# Patient Record
Sex: MALE | Race: WHITE | NOT HISPANIC OR LATINO
[De-identification: names, ages, dates, MRNs, and addresses within clinical notes are randomized per-mention and may not be internally consistent; named-entity substitution may affect disease eponyms.]

---

## 2019-04-09 ENCOUNTER — HOSPITAL (OUTPATIENT)
Dept: OTHER | Age: 59
End: 2019-04-09

## 2019-04-19 ENCOUNTER — HOSPITAL (OUTPATIENT)
Dept: OTHER | Age: 59
End: 2019-04-19

## 2019-04-22 ENCOUNTER — HOSPITAL (OUTPATIENT)
Dept: OTHER | Age: 59
End: 2019-04-22

## 2019-04-26 ENCOUNTER — HOSPITAL (OUTPATIENT)
Dept: OTHER | Age: 59
End: 2019-04-26

## 2019-06-17 ENCOUNTER — WALK IN (OUTPATIENT)
Dept: URGENT CARE | Age: 59
End: 2019-06-17

## 2019-06-17 VITALS
BODY MASS INDEX: 36.45 KG/M2 | OXYGEN SATURATION: 96 % | TEMPERATURE: 98.2 F | DIASTOLIC BLOOD PRESSURE: 80 MMHG | WEIGHT: 315 LBS | HEART RATE: 80 BPM | HEIGHT: 78 IN | SYSTOLIC BLOOD PRESSURE: 128 MMHG | RESPIRATION RATE: 20 BRPM

## 2019-06-17 RX ORDER — ESOMEPRAZOLE MAGNESIUM 40 MG/1
40 CAPSULE, DELAYED RELEASE ORAL
COMMUNITY

## 2019-06-17 RX ORDER — DESLORATADINE 5 MG/1
5 TABLET ORAL DAILY
COMMUNITY

## 2019-06-17 RX ORDER — MELOXICAM 15 MG/1
15 TABLET ORAL DAILY
COMMUNITY

## 2019-06-17 RX ORDER — SIMVASTATIN 40 MG
40 TABLET ORAL NIGHTLY
COMMUNITY

## 2019-06-17 RX ORDER — GLIMEPIRIDE 2 MG/1
2 TABLET ORAL
COMMUNITY

## 2019-06-17 RX ORDER — AMLODIPINE BESYLATE 5 MG/1
5 TABLET ORAL DAILY
COMMUNITY

## 2019-06-17 RX ORDER — IRBESARTAN 150 MG/1
150 TABLET ORAL NIGHTLY
COMMUNITY

## 2021-06-24 ENCOUNTER — APPOINTMENT (OUTPATIENT)
Dept: GENERAL RADIOLOGY | Facility: HOSPITAL | Age: 61
End: 2021-06-24

## 2021-06-24 ENCOUNTER — ANESTHESIA (OUTPATIENT)
Dept: PERIOP | Facility: HOSPITAL | Age: 61
End: 2021-06-24

## 2021-06-24 ENCOUNTER — APPOINTMENT (OUTPATIENT)
Dept: CT IMAGING | Facility: HOSPITAL | Age: 61
End: 2021-06-24

## 2021-06-24 ENCOUNTER — HOSPITAL ENCOUNTER (OUTPATIENT)
Facility: HOSPITAL | Age: 61
Discharge: HOME | End: 2021-06-25
Attending: INTERNAL MEDICINE | Admitting: INTERNAL MEDICINE

## 2021-06-24 ENCOUNTER — ANESTHESIA EVENT (OUTPATIENT)
Dept: PERIOP | Facility: HOSPITAL | Age: 61
End: 2021-06-24

## 2021-06-24 DIAGNOSIS — N17.9 ACUTE KIDNEY INJURY: ICD-10-CM

## 2021-06-24 DIAGNOSIS — N17.9 ACUTE RENAL FAILURE, UNSPECIFIED ACUTE RENAL FAILURE TYPE: ICD-10-CM

## 2021-06-24 DIAGNOSIS — N20.1 LEFT URETERAL STONE: ICD-10-CM

## 2021-06-24 DIAGNOSIS — N20.1 URETEROLITHIASIS: Primary | ICD-10-CM

## 2021-06-24 PROBLEM — R10.9 ACUTE LEFT FLANK PAIN: Status: ACTIVE | Noted: 2021-06-24

## 2021-06-24 PROBLEM — N13.30 HYDRONEPHROSIS: Status: ACTIVE | Noted: 2021-06-24

## 2021-06-24 LAB
ALBUMIN SERPL-MCNC: 3.9 G/DL (ref 3.5–5.2)
ALBUMIN/GLOB SERPL: 1.2 G/DL
ALP SERPL-CCNC: 76 U/L (ref 39–117)
ALT SERPL W P-5'-P-CCNC: 28 U/L (ref 1–41)
ANION GAP SERPL CALCULATED.3IONS-SCNC: 12 MMOL/L (ref 5–15)
AST SERPL-CCNC: 20 U/L (ref 1–40)
BASOPHILS # BLD AUTO: 0.05 10*3/MM3 (ref 0–0.2)
BASOPHILS NFR BLD AUTO: 0.6 % (ref 0–1.5)
BILIRUB SERPL-MCNC: 0.2 MG/DL (ref 0–1.2)
BUN SERPL-MCNC: 42 MG/DL (ref 8–23)
BUN/CREAT SERPL: 13.8 (ref 7–25)
CALCIUM SPEC-SCNC: 8.7 MG/DL (ref 8.6–10.5)
CHLORIDE SERPL-SCNC: 105 MMOL/L (ref 98–107)
CO2 SERPL-SCNC: 22 MMOL/L (ref 22–29)
CREAT SERPL-MCNC: 3.04 MG/DL (ref 0.76–1.27)
DEPRECATED RDW RBC AUTO: 45.2 FL (ref 37–54)
EOSINOPHIL # BLD AUTO: 0.32 10*3/MM3 (ref 0–0.4)
EOSINOPHIL NFR BLD AUTO: 3.8 % (ref 0.3–6.2)
ERYTHROCYTE [DISTWIDTH] IN BLOOD BY AUTOMATED COUNT: 12.9 % (ref 12.3–15.4)
GFR SERPL CREATININE-BSD FRML MDRD: 21 ML/MIN/1.73
GLOBULIN UR ELPH-MCNC: 3.2 GM/DL
GLUCOSE BLDC GLUCOMTR-MCNC: 140 MG/DL (ref 70–130)
GLUCOSE SERPL-MCNC: 178 MG/DL (ref 65–99)
HCT VFR BLD AUTO: 33.6 % (ref 37.5–51)
HGB BLD-MCNC: 11.2 G/DL (ref 13–17.7)
IMM GRANULOCYTES # BLD AUTO: 0.03 10*3/MM3 (ref 0–0.05)
IMM GRANULOCYTES NFR BLD AUTO: 0.4 % (ref 0–0.5)
LYMPHOCYTES # BLD AUTO: 1.93 10*3/MM3 (ref 0.7–3.1)
LYMPHOCYTES NFR BLD AUTO: 23.1 % (ref 19.6–45.3)
MCH RBC QN AUTO: 31.7 PG (ref 26.6–33)
MCHC RBC AUTO-ENTMCNC: 33.3 G/DL (ref 31.5–35.7)
MCV RBC AUTO: 95.2 FL (ref 79–97)
MONOCYTES # BLD AUTO: 0.77 10*3/MM3 (ref 0.1–0.9)
MONOCYTES NFR BLD AUTO: 9.2 % (ref 5–12)
NEUTROPHILS NFR BLD AUTO: 5.24 10*3/MM3 (ref 1.7–7)
NEUTROPHILS NFR BLD AUTO: 62.9 % (ref 42.7–76)
NRBC BLD AUTO-RTO: 0 /100 WBC (ref 0–0.2)
PLATELET # BLD AUTO: 187 10*3/MM3 (ref 140–450)
PMV BLD AUTO: 10.3 FL (ref 6–12)
POTASSIUM SERPL-SCNC: 3.8 MMOL/L (ref 3.5–5.2)
PROCALCITONIN SERPL-MCNC: 0.16 NG/ML (ref 0–0.25)
PROT SERPL-MCNC: 7.1 G/DL (ref 6–8.5)
RBC # BLD AUTO: 3.53 10*6/MM3 (ref 4.14–5.8)
SARS-COV-2 RNA PNL SPEC NAA+PROBE: NOT DETECTED
SODIUM SERPL-SCNC: 139 MMOL/L (ref 136–145)
WBC # BLD AUTO: 8.34 10*3/MM3 (ref 3.4–10.8)

## 2021-06-24 PROCEDURE — C1769 GUIDE WIRE: HCPCS | Performed by: UROLOGY

## 2021-06-24 PROCEDURE — 96374 THER/PROPH/DIAG INJ IV PUSH: CPT

## 2021-06-24 PROCEDURE — 25010000002 FENTANYL CITRATE (PF) 100 MCG/2ML SOLUTION: Performed by: NURSE ANESTHETIST, CERTIFIED REGISTERED

## 2021-06-24 PROCEDURE — 25010000002 ONDANSETRON PER 1 MG: Performed by: NURSE PRACTITIONER

## 2021-06-24 PROCEDURE — 74420 UROGRAPHY RTRGR +-KUB: CPT

## 2021-06-24 PROCEDURE — 52332 CYSTOSCOPY AND TREATMENT: CPT | Performed by: UROLOGY

## 2021-06-24 PROCEDURE — 82962 GLUCOSE BLOOD TEST: CPT

## 2021-06-24 PROCEDURE — 96376 TX/PRO/DX INJ SAME DRUG ADON: CPT

## 2021-06-24 PROCEDURE — 84145 PROCALCITONIN (PCT): CPT | Performed by: NURSE PRACTITIONER

## 2021-06-24 PROCEDURE — 25010000003 CEFAZOLIN PER 500 MG: Performed by: NURSE ANESTHETIST, CERTIFIED REGISTERED

## 2021-06-24 PROCEDURE — 74420 UROGRAPHY RTRGR +-KUB: CPT | Performed by: UROLOGY

## 2021-06-24 PROCEDURE — 99284 EMERGENCY DEPT VISIT MOD MDM: CPT

## 2021-06-24 PROCEDURE — 85025 COMPLETE CBC W/AUTO DIFF WBC: CPT | Performed by: NURSE PRACTITIONER

## 2021-06-24 PROCEDURE — C1758 CATHETER, URETERAL: HCPCS | Performed by: UROLOGY

## 2021-06-24 PROCEDURE — 80053 COMPREHEN METABOLIC PANEL: CPT | Performed by: NURSE PRACTITIONER

## 2021-06-24 PROCEDURE — 25010000002 SUCCINYLCHOLINE PER 20 MG: Performed by: NURSE ANESTHETIST, CERTIFIED REGISTERED

## 2021-06-24 PROCEDURE — BT1F1ZZ FLUOROSCOPY OF LEFT KIDNEY, URETER AND BLADDER USING LOW OSMOLAR CONTRAST: ICD-10-PCS | Performed by: UROLOGY

## 2021-06-24 PROCEDURE — 87635 SARS-COV-2 COVID-19 AMP PRB: CPT | Performed by: NURSE PRACTITIONER

## 2021-06-24 PROCEDURE — 25010000002 ONDANSETRON PER 1 MG: Performed by: NURSE ANESTHETIST, CERTIFIED REGISTERED

## 2021-06-24 PROCEDURE — 52351 CYSTOURETERO & OR PYELOSCOPE: CPT | Performed by: UROLOGY

## 2021-06-24 PROCEDURE — C2617 STENT, NON-COR, TEM W/O DEL: HCPCS | Performed by: UROLOGY

## 2021-06-24 PROCEDURE — 25010000002 HYDROMORPHONE PER 4 MG: Performed by: NURSE ANESTHETIST, CERTIFIED REGISTERED

## 2021-06-24 PROCEDURE — 25010000002 FENTANYL CITRATE (PF) 50 MCG/ML SOLUTION: Performed by: NURSE ANESTHETIST, CERTIFIED REGISTERED

## 2021-06-24 PROCEDURE — 25010000002 IOPAMIDOL 61 % SOLUTION: Performed by: UROLOGY

## 2021-06-24 PROCEDURE — 99223 1ST HOSP IP/OBS HIGH 75: CPT | Performed by: UROLOGY

## 2021-06-24 PROCEDURE — 25010000002 CEFAZOLIN PER 500 MG: Performed by: NURSE ANESTHETIST, CERTIFIED REGISTERED

## 2021-06-24 PROCEDURE — 0 HYDROMORPHONE 1 MG/ML SOLUTION: Performed by: NURSE PRACTITIONER

## 2021-06-24 PROCEDURE — 25010000002 PROPOFOL 10 MG/ML EMULSION: Performed by: NURSE ANESTHETIST, CERTIFIED REGISTERED

## 2021-06-24 PROCEDURE — 74176 CT ABD & PELVIS W/O CONTRAST: CPT

## 2021-06-24 PROCEDURE — 0T778DZ DILATION OF LEFT URETER WITH INTRALUMINAL DEVICE, VIA NATURAL OR ARTIFICIAL OPENING ENDOSCOPIC: ICD-10-PCS | Performed by: UROLOGY

## 2021-06-24 PROCEDURE — 25010000003 HYDROMORPHONE 1 MG/ML SOLUTION: Performed by: NURSE PRACTITIONER

## 2021-06-24 DEVICE — URETERAL STENT
Type: IMPLANTABLE DEVICE | Site: URETER | Status: FUNCTIONAL
Brand: PERCUFLEX™ PLUS

## 2021-06-24 RX ORDER — HYDROCHLOROTHIAZIDE 25 MG/1
25 TABLET ORAL DAILY
COMMUNITY

## 2021-06-24 RX ORDER — GLIMEPIRIDE 2 MG/1
2 TABLET ORAL
COMMUNITY

## 2021-06-24 RX ORDER — CETIRIZINE HYDROCHLORIDE 10 MG/1
10 TABLET ORAL DAILY
Status: DISCONTINUED | OUTPATIENT
Start: 2021-06-25 | End: 2021-06-25 | Stop reason: HOSPADM

## 2021-06-24 RX ORDER — IBUPROFEN 600 MG/1
600 TABLET ORAL ONCE AS NEEDED
Status: DISCONTINUED | OUTPATIENT
Start: 2021-06-24 | End: 2021-06-24 | Stop reason: HOSPADM

## 2021-06-24 RX ORDER — ONDANSETRON 2 MG/ML
4 INJECTION INTRAMUSCULAR; INTRAVENOUS EVERY 6 HOURS PRN
Status: DISCONTINUED | OUTPATIENT
Start: 2021-06-24 | End: 2021-06-25 | Stop reason: HOSPADM

## 2021-06-24 RX ORDER — ACETAMINOPHEN 650 MG/1
650 SUPPOSITORY RECTAL EVERY 4 HOURS PRN
Status: DISCONTINUED | OUTPATIENT
Start: 2021-06-24 | End: 2021-06-25 | Stop reason: HOSPADM

## 2021-06-24 RX ORDER — SODIUM CHLORIDE 0.9 % (FLUSH) 0.9 %
10 SYRINGE (ML) INJECTION AS NEEDED
Status: DISCONTINUED | OUTPATIENT
Start: 2021-06-24 | End: 2021-06-25 | Stop reason: HOSPADM

## 2021-06-24 RX ORDER — SODIUM CHLORIDE 0.9 % (FLUSH) 0.9 %
10 SYRINGE (ML) INJECTION EVERY 12 HOURS SCHEDULED
Status: DISCONTINUED | OUTPATIENT
Start: 2021-06-24 | End: 2021-06-25 | Stop reason: HOSPADM

## 2021-06-24 RX ORDER — PROPOFOL 10 MG/ML
VIAL (ML) INTRAVENOUS AS NEEDED
Status: DISCONTINUED | OUTPATIENT
Start: 2021-06-24 | End: 2021-06-24 | Stop reason: SURG

## 2021-06-24 RX ORDER — MAGNESIUM HYDROXIDE 1200 MG/15ML
LIQUID ORAL AS NEEDED
Status: DISCONTINUED | OUTPATIENT
Start: 2021-06-24 | End: 2021-06-24 | Stop reason: HOSPADM

## 2021-06-24 RX ORDER — CEFAZOLIN SODIUM 1 G/3ML
INJECTION, POWDER, FOR SOLUTION INTRAMUSCULAR; INTRAVENOUS AS NEEDED
Status: DISCONTINUED | OUTPATIENT
Start: 2021-06-24 | End: 2021-06-24 | Stop reason: SURG

## 2021-06-24 RX ORDER — LIDOCAINE HYDROCHLORIDE 10 MG/ML
0.5 INJECTION, SOLUTION EPIDURAL; INFILTRATION; INTRACAUDAL; PERINEURAL ONCE AS NEEDED
Status: CANCELLED | OUTPATIENT
Start: 2021-06-24

## 2021-06-24 RX ORDER — DEXTROSE MONOHYDRATE 25 G/50ML
25 INJECTION, SOLUTION INTRAVENOUS
Status: DISCONTINUED | OUTPATIENT
Start: 2021-06-24 | End: 2021-06-25 | Stop reason: HOSPADM

## 2021-06-24 RX ORDER — ASPIRIN 81 MG/1
81 TABLET, CHEWABLE ORAL DAILY
COMMUNITY

## 2021-06-24 RX ORDER — ATORVASTATIN CALCIUM 10 MG/1
10 TABLET, FILM COATED ORAL NIGHTLY
Status: DISCONTINUED | OUTPATIENT
Start: 2021-06-24 | End: 2021-06-25 | Stop reason: HOSPADM

## 2021-06-24 RX ORDER — ROCURONIUM BROMIDE 10 MG/ML
INJECTION, SOLUTION INTRAVENOUS AS NEEDED
Status: DISCONTINUED | OUTPATIENT
Start: 2021-06-24 | End: 2021-06-24 | Stop reason: SURG

## 2021-06-24 RX ORDER — SODIUM CHLORIDE, SODIUM LACTATE, POTASSIUM CHLORIDE, CALCIUM CHLORIDE 600; 310; 30; 20 MG/100ML; MG/100ML; MG/100ML; MG/100ML
INJECTION, SOLUTION INTRAVENOUS CONTINUOUS PRN
Status: DISCONTINUED | OUTPATIENT
Start: 2021-06-24 | End: 2021-06-24 | Stop reason: SURG

## 2021-06-24 RX ORDER — FLUMAZENIL 0.1 MG/ML
0.2 INJECTION INTRAVENOUS AS NEEDED
Status: DISCONTINUED | OUTPATIENT
Start: 2021-06-24 | End: 2021-06-24 | Stop reason: HOSPADM

## 2021-06-24 RX ORDER — ONDANSETRON 2 MG/ML
INJECTION INTRAMUSCULAR; INTRAVENOUS AS NEEDED
Status: DISCONTINUED | OUTPATIENT
Start: 2021-06-24 | End: 2021-06-24 | Stop reason: SURG

## 2021-06-24 RX ORDER — SODIUM CHLORIDE, SODIUM LACTATE, POTASSIUM CHLORIDE, CALCIUM CHLORIDE 600; 310; 30; 20 MG/100ML; MG/100ML; MG/100ML; MG/100ML
100 INJECTION, SOLUTION INTRAVENOUS CONTINUOUS
Status: DISCONTINUED | OUTPATIENT
Start: 2021-06-24 | End: 2021-06-25 | Stop reason: HOSPADM

## 2021-06-24 RX ORDER — ONDANSETRON 2 MG/ML
4 INJECTION INTRAMUSCULAR; INTRAVENOUS ONCE
Status: COMPLETED | OUTPATIENT
Start: 2021-06-24 | End: 2021-06-24

## 2021-06-24 RX ORDER — ACETAMINOPHEN 160 MG/5ML
650 SOLUTION ORAL EVERY 4 HOURS PRN
Status: DISCONTINUED | OUTPATIENT
Start: 2021-06-24 | End: 2021-06-25 | Stop reason: HOSPADM

## 2021-06-24 RX ORDER — SUCCINYLCHOLINE CHLORIDE 20 MG/ML
INJECTION INTRAMUSCULAR; INTRAVENOUS AS NEEDED
Status: DISCONTINUED | OUTPATIENT
Start: 2021-06-24 | End: 2021-06-24 | Stop reason: SURG

## 2021-06-24 RX ORDER — SODIUM CHLORIDE 0.9 % (FLUSH) 0.9 %
3-10 SYRINGE (ML) INJECTION AS NEEDED
Status: CANCELLED | OUTPATIENT
Start: 2021-06-24

## 2021-06-24 RX ORDER — DESLORATADINE 5 MG/1
5 TABLET ORAL DAILY
COMMUNITY

## 2021-06-24 RX ORDER — OXYCODONE AND ACETAMINOPHEN 10; 325 MG/1; MG/1
1 TABLET ORAL ONCE AS NEEDED
Status: DISCONTINUED | OUTPATIENT
Start: 2021-06-24 | End: 2021-06-24 | Stop reason: HOSPADM

## 2021-06-24 RX ORDER — SITAGLIPTIN AND METFORMIN HYDROCHLORIDE 1000; 50 MG/1; MG/1
1 TABLET, FILM COATED ORAL 2 TIMES DAILY WITH MEALS
Status: ON HOLD | COMMUNITY
End: 2021-06-25 | Stop reason: SDUPTHER

## 2021-06-24 RX ORDER — SIMVASTATIN 40 MG
40 TABLET ORAL NIGHTLY
COMMUNITY

## 2021-06-24 RX ORDER — HYDROMORPHONE HYDROCHLORIDE 1 MG/ML
0.5 INJECTION, SOLUTION INTRAMUSCULAR; INTRAVENOUS; SUBCUTANEOUS
Status: DISCONTINUED | OUTPATIENT
Start: 2021-06-24 | End: 2021-06-24 | Stop reason: HOSPADM

## 2021-06-24 RX ORDER — LIDOCAINE HYDROCHLORIDE 20 MG/ML
INJECTION, SOLUTION EPIDURAL; INFILTRATION; INTRACAUDAL; PERINEURAL AS NEEDED
Status: DISCONTINUED | OUTPATIENT
Start: 2021-06-24 | End: 2021-06-24 | Stop reason: SURG

## 2021-06-24 RX ORDER — ONDANSETRON 4 MG/1
4 TABLET, FILM COATED ORAL EVERY 6 HOURS PRN
Status: DISCONTINUED | OUTPATIENT
Start: 2021-06-24 | End: 2021-06-25 | Stop reason: HOSPADM

## 2021-06-24 RX ORDER — LABETALOL HYDROCHLORIDE 5 MG/ML
5 INJECTION, SOLUTION INTRAVENOUS
Status: DISCONTINUED | OUTPATIENT
Start: 2021-06-24 | End: 2021-06-24 | Stop reason: HOSPADM

## 2021-06-24 RX ORDER — NICOTINE POLACRILEX 4 MG
15 LOZENGE BUCCAL
Status: DISCONTINUED | OUTPATIENT
Start: 2021-06-24 | End: 2021-06-25 | Stop reason: HOSPADM

## 2021-06-24 RX ORDER — SODIUM CHLORIDE, SODIUM LACTATE, POTASSIUM CHLORIDE, CALCIUM CHLORIDE 600; 310; 30; 20 MG/100ML; MG/100ML; MG/100ML; MG/100ML
100 INJECTION, SOLUTION INTRAVENOUS CONTINUOUS
Status: CANCELLED | OUTPATIENT
Start: 2021-06-24

## 2021-06-24 RX ORDER — ONDANSETRON 2 MG/ML
4 INJECTION INTRAMUSCULAR; INTRAVENOUS AS NEEDED
Status: DISCONTINUED | OUTPATIENT
Start: 2021-06-24 | End: 2021-06-24 | Stop reason: HOSPADM

## 2021-06-24 RX ORDER — FENTANYL CITRATE 50 UG/ML
INJECTION, SOLUTION INTRAMUSCULAR; INTRAVENOUS AS NEEDED
Status: DISCONTINUED | OUTPATIENT
Start: 2021-06-24 | End: 2021-06-24 | Stop reason: SURG

## 2021-06-24 RX ORDER — ACETAMINOPHEN 325 MG/1
650 TABLET ORAL EVERY 4 HOURS PRN
Status: DISCONTINUED | OUTPATIENT
Start: 2021-06-24 | End: 2021-06-25 | Stop reason: HOSPADM

## 2021-06-24 RX ORDER — NALOXONE HCL 0.4 MG/ML
0.04 VIAL (ML) INJECTION AS NEEDED
Status: DISCONTINUED | OUTPATIENT
Start: 2021-06-24 | End: 2021-06-24 | Stop reason: HOSPADM

## 2021-06-24 RX ORDER — SODIUM CHLORIDE 0.9 % (FLUSH) 0.9 %
3 SYRINGE (ML) INJECTION EVERY 12 HOURS SCHEDULED
Status: CANCELLED | OUTPATIENT
Start: 2021-06-24

## 2021-06-24 RX ORDER — MIDAZOLAM HYDROCHLORIDE 1 MG/ML
1 INJECTION INTRAMUSCULAR; INTRAVENOUS
Status: CANCELLED | OUTPATIENT
Start: 2021-06-24

## 2021-06-24 RX ORDER — ESOMEPRAZOLE MAGNESIUM 40 MG/1
40 CAPSULE, DELAYED RELEASE ORAL
COMMUNITY

## 2021-06-24 RX ORDER — ACETAMINOPHEN 500 MG
1000 TABLET ORAL ONCE
Status: CANCELLED | OUTPATIENT
Start: 2021-06-24 | End: 2021-06-24

## 2021-06-24 RX ORDER — FENTANYL CITRATE 50 UG/ML
25 INJECTION, SOLUTION INTRAMUSCULAR; INTRAVENOUS
Status: COMPLETED | OUTPATIENT
Start: 2021-06-24 | End: 2021-06-24

## 2021-06-24 RX ORDER — PANTOPRAZOLE SODIUM 40 MG/1
40 TABLET, DELAYED RELEASE ORAL EVERY MORNING
Status: DISCONTINUED | OUTPATIENT
Start: 2021-06-25 | End: 2021-06-25 | Stop reason: HOSPADM

## 2021-06-24 RX ADMIN — HYDROMORPHONE HYDROCHLORIDE 1 MG: 1 INJECTION, SOLUTION INTRAMUSCULAR; INTRAVENOUS; SUBCUTANEOUS at 18:36

## 2021-06-24 RX ADMIN — FENTANYL CITRATE 25 MCG: 50 INJECTION, SOLUTION INTRAMUSCULAR; INTRAVENOUS at 21:23

## 2021-06-24 RX ADMIN — CEFAZOLIN 3 G: 330 INJECTION, POWDER, FOR SOLUTION INTRAMUSCULAR; INTRAVENOUS at 20:27

## 2021-06-24 RX ADMIN — SODIUM CHLORIDE, POTASSIUM CHLORIDE, SODIUM LACTATE AND CALCIUM CHLORIDE: 600; 310; 30; 20 INJECTION, SOLUTION INTRAVENOUS at 20:16

## 2021-06-24 RX ADMIN — FENTANYL CITRATE 25 MCG: 50 INJECTION, SOLUTION INTRAMUSCULAR; INTRAVENOUS at 21:18

## 2021-06-24 RX ADMIN — FENTANYL CITRATE 100 MCG: 50 INJECTION, SOLUTION INTRAMUSCULAR; INTRAVENOUS at 20:19

## 2021-06-24 RX ADMIN — LIDOCAINE HYDROCHLORIDE 100 MG: 20 INJECTION, SOLUTION EPIDURAL; INFILTRATION; INTRACAUDAL; PERINEURAL at 20:46

## 2021-06-24 RX ADMIN — LIDOCAINE HYDROCHLORIDE 100 MG: 20 INJECTION, SOLUTION EPIDURAL; INFILTRATION; INTRACAUDAL; PERINEURAL at 20:19

## 2021-06-24 RX ADMIN — FENTANYL CITRATE 25 MCG: 50 INJECTION, SOLUTION INTRAMUSCULAR; INTRAVENOUS at 21:30

## 2021-06-24 RX ADMIN — ATORVASTATIN CALCIUM 10 MG: 10 TABLET, FILM COATED ORAL at 23:09

## 2021-06-24 RX ADMIN — SODIUM CHLORIDE, PRESERVATIVE FREE 10 ML: 5 INJECTION INTRAVENOUS at 22:42

## 2021-06-24 RX ADMIN — HYDROMORPHONE HYDROCHLORIDE 0.5 MG: 1 INJECTION, SOLUTION INTRAMUSCULAR; INTRAVENOUS; SUBCUTANEOUS at 21:09

## 2021-06-24 RX ADMIN — SODIUM CHLORIDE, POTASSIUM CHLORIDE, SODIUM LACTATE AND CALCIUM CHLORIDE 100 ML/HR: 600; 310; 30; 20 INJECTION, SOLUTION INTRAVENOUS at 22:42

## 2021-06-24 RX ADMIN — ONDANSETRON 4 MG: 2 INJECTION INTRAMUSCULAR; INTRAVENOUS at 18:36

## 2021-06-24 RX ADMIN — SUCCINYLCHOLINE CHLORIDE 200 MG: 20 INJECTION, SOLUTION INTRAMUSCULAR; INTRAVENOUS at 20:20

## 2021-06-24 RX ADMIN — PROPOFOL 200 MG: 10 INJECTION, EMULSION INTRAVENOUS at 20:19

## 2021-06-24 RX ADMIN — ONDANSETRON 4 MG: 2 INJECTION INTRAMUSCULAR; INTRAVENOUS at 20:26

## 2021-06-24 RX ADMIN — ROCURONIUM BROMIDE 5 MG: 50 INJECTION INTRAVENOUS at 20:19

## 2021-06-24 RX ADMIN — FENTANYL CITRATE 25 MCG: 50 INJECTION, SOLUTION INTRAMUSCULAR; INTRAVENOUS at 21:13

## 2021-06-24 RX ADMIN — SODIUM CHLORIDE 500 ML: 9 INJECTION, SOLUTION INTRAVENOUS at 18:36

## 2021-06-25 VITALS
BODY MASS INDEX: 34.94 KG/M2 | HEIGHT: 78 IN | HEART RATE: 66 BPM | DIASTOLIC BLOOD PRESSURE: 68 MMHG | RESPIRATION RATE: 18 BRPM | OXYGEN SATURATION: 96 % | TEMPERATURE: 98 F | SYSTOLIC BLOOD PRESSURE: 121 MMHG | WEIGHT: 302 LBS

## 2021-06-25 LAB
ALBUMIN SERPL-MCNC: 3.5 G/DL (ref 3.5–5.2)
ANION GAP SERPL CALCULATED.3IONS-SCNC: 9 MMOL/L (ref 5–15)
BUN SERPL-MCNC: 30 MG/DL (ref 8–23)
BUN SERPL-MCNC: 31 MG/DL (ref 8–23)
BUN SERPL-MCNC: 36 MG/DL (ref 8–23)
BUN/CREAT SERPL: 16 (ref 7–25)
BUN/CREAT SERPL: 16.1 (ref 7–25)
BUN/CREAT SERPL: 17.1 (ref 7–25)
CALCIUM SPEC-SCNC: 8.5 MG/DL (ref 8.6–10.5)
CALCIUM SPEC-SCNC: 9 MG/DL (ref 8.6–10.5)
CALCIUM SPEC-SCNC: 9.2 MG/DL (ref 8.6–10.5)
CHLORIDE SERPL-SCNC: 104 MMOL/L (ref 98–107)
CHLORIDE SERPL-SCNC: 104 MMOL/L (ref 98–107)
CHLORIDE SERPL-SCNC: 106 MMOL/L (ref 98–107)
CHOLEST SERPL-MCNC: 121 MG/DL (ref 0–200)
CO2 SERPL-SCNC: 22 MMOL/L (ref 22–29)
CO2 SERPL-SCNC: 25 MMOL/L (ref 22–29)
CO2 SERPL-SCNC: 25 MMOL/L (ref 22–29)
CREAT SERPL-MCNC: 1.88 MG/DL (ref 0.76–1.27)
CREAT SERPL-MCNC: 1.93 MG/DL (ref 0.76–1.27)
CREAT SERPL-MCNC: 2.1 MG/DL (ref 0.76–1.27)
DEPRECATED RDW RBC AUTO: 44 FL (ref 37–54)
ERYTHROCYTE [DISTWIDTH] IN BLOOD BY AUTOMATED COUNT: 12.6 % (ref 12.3–15.4)
GFR SERPL CREATININE-BSD FRML MDRD: 32 ML/MIN/1.73
GFR SERPL CREATININE-BSD FRML MDRD: 36 ML/MIN/1.73
GFR SERPL CREATININE-BSD FRML MDRD: 37 ML/MIN/1.73
GLUCOSE BLDC GLUCOMTR-MCNC: 242 MG/DL (ref 70–130)
GLUCOSE BLDC GLUCOMTR-MCNC: 268 MG/DL (ref 70–130)
GLUCOSE SERPL-MCNC: 191 MG/DL (ref 65–99)
GLUCOSE SERPL-MCNC: 275 MG/DL (ref 65–99)
GLUCOSE SERPL-MCNC: 277 MG/DL (ref 65–99)
HBA1C MFR BLD: 8.6 % (ref 4.8–5.6)
HCT VFR BLD AUTO: 32.9 % (ref 37.5–51)
HDLC SERPL-MCNC: 48 MG/DL (ref 40–60)
HGB BLD-MCNC: 10.6 G/DL (ref 13–17.7)
LDLC SERPL CALC-MCNC: 48 MG/DL (ref 0–100)
LDLC/HDLC SERPL: 0.9 {RATIO}
MCH RBC QN AUTO: 30.9 PG (ref 26.6–33)
MCHC RBC AUTO-ENTMCNC: 32.2 G/DL (ref 31.5–35.7)
MCV RBC AUTO: 95.9 FL (ref 79–97)
PHOSPHATE SERPL-MCNC: 3 MG/DL (ref 2.5–4.5)
PLATELET # BLD AUTO: 182 10*3/MM3 (ref 140–450)
PMV BLD AUTO: 11 FL (ref 6–12)
POTASSIUM SERPL-SCNC: 4.1 MMOL/L (ref 3.5–5.2)
POTASSIUM SERPL-SCNC: 4.1 MMOL/L (ref 3.5–5.2)
POTASSIUM SERPL-SCNC: 4.4 MMOL/L (ref 3.5–5.2)
RBC # BLD AUTO: 3.43 10*6/MM3 (ref 4.14–5.8)
SODIUM SERPL-SCNC: 137 MMOL/L (ref 136–145)
SODIUM SERPL-SCNC: 138 MMOL/L (ref 136–145)
SODIUM SERPL-SCNC: 138 MMOL/L (ref 136–145)
TRIGL SERPL-MCNC: 149 MG/DL (ref 0–150)
VLDLC SERPL-MCNC: 25 MG/DL (ref 5–40)
WBC # BLD AUTO: 7.74 10*3/MM3 (ref 3.4–10.8)
WHOLE BLOOD HOLD SPECIMEN: NORMAL

## 2021-06-25 PROCEDURE — 80069 RENAL FUNCTION PANEL: CPT | Performed by: UROLOGY

## 2021-06-25 PROCEDURE — 99232 SBSQ HOSP IP/OBS MODERATE 35: CPT | Performed by: UROLOGY

## 2021-06-25 PROCEDURE — 84100 ASSAY OF PHOSPHORUS: CPT

## 2021-06-25 PROCEDURE — 80048 BASIC METABOLIC PNL TOTAL CA: CPT | Performed by: NURSE PRACTITIONER

## 2021-06-25 PROCEDURE — 80061 LIPID PANEL: CPT | Performed by: NURSE PRACTITIONER

## 2021-06-25 PROCEDURE — 82040 ASSAY OF SERUM ALBUMIN: CPT | Performed by: UROLOGY

## 2021-06-25 PROCEDURE — 85027 COMPLETE CBC AUTOMATED: CPT | Performed by: NURSE PRACTITIONER

## 2021-06-25 PROCEDURE — 83036 HEMOGLOBIN GLYCOSYLATED A1C: CPT | Performed by: NURSE PRACTITIONER

## 2021-06-25 PROCEDURE — 82962 GLUCOSE BLOOD TEST: CPT

## 2021-06-25 RX ORDER — TAMSULOSIN HYDROCHLORIDE 0.4 MG/1
0.4 CAPSULE ORAL DAILY
Qty: 30 CAPSULE | Refills: 0 | Status: SHIPPED | OUTPATIENT
Start: 2021-06-26 | End: 2021-07-26

## 2021-06-25 RX ORDER — TAMSULOSIN HYDROCHLORIDE 0.4 MG/1
0.4 CAPSULE ORAL DAILY
Status: DISCONTINUED | OUTPATIENT
Start: 2021-06-25 | End: 2021-06-25 | Stop reason: HOSPADM

## 2021-06-25 RX ORDER — SITAGLIPTIN AND METFORMIN HYDROCHLORIDE 1000; 50 MG/1; MG/1
1 TABLET, FILM COATED ORAL 2 TIMES DAILY WITH MEALS
Start: 2021-06-28

## 2021-06-25 RX ADMIN — CETIRIZINE HYDROCHLORIDE 10 MG: 10 TABLET ORAL at 09:20

## 2021-06-25 RX ADMIN — PANTOPRAZOLE SODIUM 40 MG: 40 TABLET, DELAYED RELEASE ORAL at 07:34

## 2021-06-25 RX ADMIN — TAMSULOSIN HYDROCHLORIDE 0.4 MG: 0.4 CAPSULE ORAL at 09:21

## 2021-06-25 NOTE — ANESTHESIA POSTPROCEDURE EVALUATION
"Patient: Hermilo Mccord    Procedure Summary     Date: 06/24/21 Room / Location:  [facility] OR 01 /  [facility]    Anesthesia Start: 2016 Anesthesia Stop: 2057    Procedure: CYSTOSCOPY RETROGRADE PYELOGRAM AND STENT INSERTION (Left Bladder) Diagnosis:       Left ureteral stone      (Left ureteral stone [N20.1])    Surgeons: Jamil Santiago MD Provider: Rabia Pretty CRNA    Anesthesia Type: general ASA Status: 2 - Emergent          Anesthesia Type: general    Vitals  Vitals Value Taken Time   /93 06/24/21 2155   Temp 99.1 °F (37.3 °C) 06/24/21 2155   Pulse 78 06/24/21 2200   Resp 16 06/24/21 2155   SpO2 97 % 06/24/21 2200   Vitals shown include unvalidated device data.        Post Anesthesia Care and Evaluation    Patient location during evaluation: PACU  Patient participation: complete - patient participated  Level of consciousness: awake and alert  Pain management: adequate  Airway patency: patent  Anesthetic complications: No anesthetic complications    Cardiovascular status: acceptable  Respiratory status: acceptable  Hydration status: acceptable    Comments: Blood pressure 126/79, pulse 61, temperature 97.8 °F (36.6 °C), temperature source Oral, resp. rate 18, height 198.1 cm (78\"), weight (!) 137 kg (302 lb), SpO2 95 %.    Pt discharged from PACU based on bettie score >8      "

## 2021-06-25 NOTE — ANESTHESIA PREPROCEDURE EVALUATION
Anesthesia Evaluation     no history of anesthetic complications:  NPO Solid Status: Waived due to emergency  NPO Liquid Status: Waived due to emergency           Airway   Mallampati: I  TM distance: >3 FB  Neck ROM: full  No difficulty expected  Dental - normal exam     Pulmonary    (-) not a smoker  Cardiovascular   Exercise tolerance: good (4-7 METS)    (+) hypertension, hyperlipidemia,   (-) past MI, CAD      Neuro/Psych  (-) seizures, CVA  GI/Hepatic/Renal/Endo    (+) obesity,   renal disease ARF and stones, diabetes mellitus type 2,     Musculoskeletal     Abdominal    Substance History      OB/GYN          Other                      Anesthesia Plan    ASA 2 - emergent     general   Rapid sequence  intravenous induction     Anesthetic plan, all risks, benefits, and alternatives have been provided, discussed and informed consent has been obtained with: patient.    Plan discussed with CRNA.

## 2021-06-25 NOTE — ANESTHESIA PROCEDURE NOTES
Airway  Urgency: elective    Date/Time: 6/24/2021 8:21 PM  Airway not difficult    General Information and Staff    Patient location during procedure: OR  CRNA: Rabia Pretty CRNA    Indications and Patient Condition  Indications for airway management: airway protection    Preoxygenated: yes  Mask difficulty assessment: 0 - not attempted    Final Airway Details  Final airway type: endotracheal airway      Successful airway: ETT  Cuffed: yes   Successful intubation technique: direct laryngoscopy, video laryngoscopy and RSI  Facilitating devices/methods: intubating stylet and cricoid pressure  Endotracheal tube insertion site: oral  Blade: Guajardo  Blade size: 4  ETT size (mm): 7.5  Cormack-Lehane Classification: grade I - full view of glottis  Placement verified by: chest auscultation and capnometry   Cuff volume (mL): 6  Measured from: lips  ETT/EBT  to lips (cm): 23  Number of attempts at approach: 1  Assessment: lips, teeth, and gum same as pre-op and atraumatic intubation

## 2021-07-01 NOTE — H&P (VIEW-ONLY)
Subjective    Mr. Mccord is 60 y.o. male    Chief Complaint: Ureter stone    History of Present Illness     Patient presents for his hospital follow-up for his possible ureteral stone.  That was previously stented.  He is having some mild stent discomfort.  He denies fevers, chills and rigors.  He is ready to set up surgery as he does not believe he has passed the stone.    The following portions of the patient's history were reviewed and updated as appropriate: allergies, current medications, past family history, past medical history, past social history, past surgical history and problem list.    Review of Systems   Constitutional: Negative for chills and fever.   HENT: Negative.    Eyes: Negative.    Respiratory: Negative for shortness of breath.    Cardiovascular: Negative for chest pain.   Gastrointestinal: Negative for abdominal pain, anal bleeding, blood in stool, nausea and vomiting.   Endocrine: Negative.    Genitourinary: Negative for difficulty urinating, dysuria, flank pain, frequency, hematuria and urgency.   Musculoskeletal: Negative.    Skin: Negative.    Allergic/Immunologic: Negative.    Neurological: Negative.    Hematological: Negative.    Psychiatric/Behavioral: Negative.          Current Outpatient Medications:   •  aspirin 81 MG chewable tablet, Chew 81 mg Daily., Disp: , Rfl:   •  desloratadine (CLARINEX) 5 MG tablet, Take 5 mg by mouth Daily., Disp: , Rfl:   •  esomeprazole (nexIUM) 40 MG capsule, Take 40 mg by mouth Every Morning Before Breakfast., Disp: , Rfl:   •  glimepiride (AMARYL) 2 MG tablet, Take 2 mg by mouth Every Morning Before Breakfast., Disp: , Rfl:   •  hydroCHLOROthiazide (HYDRODIURIL) 25 MG tablet, Take 25 mg by mouth Daily., Disp: , Rfl:   •  simvastatin (ZOCOR) 40 MG tablet, Take 40 mg by mouth Every Night., Disp: , Rfl:   •  sitaGLIPtin-metFORMIN (Janumet)  MG per tablet, Take 1 tablet by mouth 2 (Two) Times a Day With Meals., Disp: , Rfl:   •  tamsulosin  "(FLOMAX) 0.4 MG capsule 24 hr capsule, Take 1 capsule by mouth Daily for 30 days., Disp: 30 capsule, Rfl: 0    Past Medical History:   Diagnosis Date   • Diabetes mellitus (CMS/HCC)    • Hyperlipidemia    • Hypertension    • Kidney stone        Past Surgical History:   Procedure Laterality Date   • APPENDECTOMY     • CARPAL TUNNEL RELEASE Bilateral    • CYSTOSCOPY, RETROGRADE PYELOGRAM AND STENT INSERTION Left 6/24/2021    Procedure: CYSTOSCOPY RETROGRADE PYELOGRAM AND STENT INSERTION;  Surgeon: Jamil Santiago MD;  Location: [facility];  Service: Urology;  Laterality: Left;   • ELBOW PROCEDURE Right    • KNEE SURGERY Bilateral    • SINUS SURGERY         Social History     Socioeconomic History   • Marital status:      Spouse name: Not on file   • Number of children: Not on file   • Years of education: Not on file   • Highest education level: Not on file   Tobacco Use   • Smoking status: Never Smoker   • Smokeless tobacco: Never Used   Vaping Use   • Vaping Use: Never used   Substance and Sexual Activity   • Alcohol use: Yes     Comment: OCC   • Drug use: Not Currently   • Sexual activity: Defer       History reviewed. No pertinent family history.    Objective    Temp 97.8 °F (36.6 °C)   Ht 198.1 cm (78\")   Wt (!) 137 kg (301 lb 3.2 oz)   BMI 34.81 kg/m²     Physical Exam  Constitutional:       Appearance: He is well-developed.   HENT:      Head: Normocephalic and atraumatic.   Eyes:      Conjunctiva/sclera: Conjunctivae normal.   Cardiovascular:      Rate and Rhythm: Normal rate and regular rhythm.   Pulmonary:      Effort: Pulmonary effort is normal.   Abdominal:      Palpations: Abdomen is soft.   Musculoskeletal:         General: Normal range of motion.      Cervical back: Normal range of motion and neck supple.   Skin:     General: Skin is warm and dry.   Neurological:      Mental Status: He is alert and oriented to person, place, and time.   Psychiatric:         Behavior: Behavior normal. "             Results for orders placed or performed in visit on 07/07/21   POC Urinalysis Dipstick, Multipro    Specimen: Urine   Result Value Ref Range    Color Stacey Yellow, Straw, Dark Yellow, Stacey    Clarity, UA Cloudy (A) Clear    Glucose,  mg/dL (A) Negative, 1000 mg/dL (3+) mg/dL    Bilirubin Small (1+) (A) Negative    Ketones, UA Negative Negative    Specific Gravity  1.020 1.005 - 1.030    Blood, UA Large (A) Negative    pH, Urine 5.0 5.0 - 8.0    Protein,  mg/dL (A) Negative mg/dL    Urobilinogen, UA Normal Normal    Nitrite, UA Negative Negative    Leukocytes Small (1+) (A) Negative     Assessment and Plan    Diagnoses and all orders for this visit:    1. Ureterolithiasis (Primary)  -     POC Urinalysis Dipstick, Multipro  -     Case Request; Standing  -     levoFLOXacin (LEVAQUIN) 500 mg in sodium chloride 0.9 % 150 mL IVPB  -     ondansetron (ZOFRAN) injection 4 mg  -     Case Request    Other orders  -     Follow Anesthesia Guidelines / Standing Orders; Future  -     Provide NPO Instructions to Patient; Future  -     Follow Anesthesia Guidelines / Standing Orders; Standing  -     Verify NPO Status; Standing  -     Obtain Informed Consent; Standing  -     SCD (Sequential Compression Device) - Place on Patient in Pre-Op; Standing      We will schedule the patient for cystoscopy, left retrograde pyelogram, insertion left ureteral stent, possible left ureteroscopy with laser lithotripsy and stone basket extraction, indicated procedures as an outpatient.  We discussed the risks and benefits including bleeding, infection, injury to the urinary tract, failure of the procedure etc.  Patient agrees to proceed.  All questions were answered.

## 2021-07-01 NOTE — PROGRESS NOTES
Subjective    Mr. Mccord is 60 y.o. male    Chief Complaint: Ureter stone    History of Present Illness     Patient presents for his hospital follow-up for his possible ureteral stone.  That was previously stented.  He is having some mild stent discomfort.  He denies fevers, chills and rigors.  He is ready to set up surgery as he does not believe he has passed the stone.    The following portions of the patient's history were reviewed and updated as appropriate: allergies, current medications, past family history, past medical history, past social history, past surgical history and problem list.    Review of Systems   Constitutional: Negative for chills and fever.   HENT: Negative.    Eyes: Negative.    Respiratory: Negative for shortness of breath.    Cardiovascular: Negative for chest pain.   Gastrointestinal: Negative for abdominal pain, anal bleeding, blood in stool, nausea and vomiting.   Endocrine: Negative.    Genitourinary: Negative for difficulty urinating, dysuria, flank pain, frequency, hematuria and urgency.   Musculoskeletal: Negative.    Skin: Negative.    Allergic/Immunologic: Negative.    Neurological: Negative.    Hematological: Negative.    Psychiatric/Behavioral: Negative.          Current Outpatient Medications:   •  aspirin 81 MG chewable tablet, Chew 81 mg Daily., Disp: , Rfl:   •  desloratadine (CLARINEX) 5 MG tablet, Take 5 mg by mouth Daily., Disp: , Rfl:   •  esomeprazole (nexIUM) 40 MG capsule, Take 40 mg by mouth Every Morning Before Breakfast., Disp: , Rfl:   •  glimepiride (AMARYL) 2 MG tablet, Take 2 mg by mouth Every Morning Before Breakfast., Disp: , Rfl:   •  hydroCHLOROthiazide (HYDRODIURIL) 25 MG tablet, Take 25 mg by mouth Daily., Disp: , Rfl:   •  simvastatin (ZOCOR) 40 MG tablet, Take 40 mg by mouth Every Night., Disp: , Rfl:   •  sitaGLIPtin-metFORMIN (Janumet)  MG per tablet, Take 1 tablet by mouth 2 (Two) Times a Day With Meals., Disp: , Rfl:   •  tamsulosin  "(FLOMAX) 0.4 MG capsule 24 hr capsule, Take 1 capsule by mouth Daily for 30 days., Disp: 30 capsule, Rfl: 0    Past Medical History:   Diagnosis Date   • Diabetes mellitus (CMS/HCC)    • Hyperlipidemia    • Hypertension    • Kidney stone        Past Surgical History:   Procedure Laterality Date   • APPENDECTOMY     • CARPAL TUNNEL RELEASE Bilateral    • CYSTOSCOPY, RETROGRADE PYELOGRAM AND STENT INSERTION Left 6/24/2021    Procedure: CYSTOSCOPY RETROGRADE PYELOGRAM AND STENT INSERTION;  Surgeon: Jamil Snatiago MD;  Location: [facility];  Service: Urology;  Laterality: Left;   • ELBOW PROCEDURE Right    • KNEE SURGERY Bilateral    • SINUS SURGERY         Social History     Socioeconomic History   • Marital status:      Spouse name: Not on file   • Number of children: Not on file   • Years of education: Not on file   • Highest education level: Not on file   Tobacco Use   • Smoking status: Never Smoker   • Smokeless tobacco: Never Used   Vaping Use   • Vaping Use: Never used   Substance and Sexual Activity   • Alcohol use: Yes     Comment: OCC   • Drug use: Not Currently   • Sexual activity: Defer       History reviewed. No pertinent family history.    Objective    Temp 97.8 °F (36.6 °C)   Ht 198.1 cm (78\")   Wt (!) 137 kg (301 lb 3.2 oz)   BMI 34.81 kg/m²     Physical Exam  Constitutional:       Appearance: He is well-developed.   HENT:      Head: Normocephalic and atraumatic.   Eyes:      Conjunctiva/sclera: Conjunctivae normal.   Cardiovascular:      Rate and Rhythm: Normal rate and regular rhythm.   Pulmonary:      Effort: Pulmonary effort is normal.   Abdominal:      Palpations: Abdomen is soft.   Musculoskeletal:         General: Normal range of motion.      Cervical back: Normal range of motion and neck supple.   Skin:     General: Skin is warm and dry.   Neurological:      Mental Status: He is alert and oriented to person, place, and time.   Psychiatric:         Behavior: Behavior normal. "             Results for orders placed or performed in visit on 07/07/21   POC Urinalysis Dipstick, Multipro    Specimen: Urine   Result Value Ref Range    Color Stacey Yellow, Straw, Dark Yellow, Stacey    Clarity, UA Cloudy (A) Clear    Glucose,  mg/dL (A) Negative, 1000 mg/dL (3+) mg/dL    Bilirubin Small (1+) (A) Negative    Ketones, UA Negative Negative    Specific Gravity  1.020 1.005 - 1.030    Blood, UA Large (A) Negative    pH, Urine 5.0 5.0 - 8.0    Protein,  mg/dL (A) Negative mg/dL    Urobilinogen, UA Normal Normal    Nitrite, UA Negative Negative    Leukocytes Small (1+) (A) Negative     Assessment and Plan    Diagnoses and all orders for this visit:    1. Ureterolithiasis (Primary)  -     POC Urinalysis Dipstick, Multipro  -     Case Request; Standing  -     levoFLOXacin (LEVAQUIN) 500 mg in sodium chloride 0.9 % 150 mL IVPB  -     ondansetron (ZOFRAN) injection 4 mg  -     Case Request    Other orders  -     Follow Anesthesia Guidelines / Standing Orders; Future  -     Provide NPO Instructions to Patient; Future  -     Follow Anesthesia Guidelines / Standing Orders; Standing  -     Verify NPO Status; Standing  -     Obtain Informed Consent; Standing  -     SCD (Sequential Compression Device) - Place on Patient in Pre-Op; Standing      We will schedule the patient for cystoscopy, left retrograde pyelogram, insertion left ureteral stent, possible left ureteroscopy with laser lithotripsy and stone basket extraction, indicated procedures as an outpatient.  We discussed the risks and benefits including bleeding, infection, injury to the urinary tract, failure of the procedure etc.  Patient agrees to proceed.  All questions were answered.

## 2021-07-07 ENCOUNTER — LAB (OUTPATIENT)
Dept: LAB | Facility: HOSPITAL | Age: 61
End: 2021-07-07

## 2021-07-07 ENCOUNTER — OFFICE VISIT (OUTPATIENT)
Dept: UROLOGY | Facility: CLINIC | Age: 61
End: 2021-07-07

## 2021-07-07 ENCOUNTER — TRANSCRIBE ORDERS (OUTPATIENT)
Dept: ADMINISTRATIVE | Facility: HOSPITAL | Age: 61
End: 2021-07-07

## 2021-07-07 VITALS — HEIGHT: 78 IN | BODY MASS INDEX: 34.85 KG/M2 | TEMPERATURE: 97.8 F | WEIGHT: 301.2 LBS

## 2021-07-07 DIAGNOSIS — Z11.59 SCREENING FOR VIRAL DISEASE: Primary | ICD-10-CM

## 2021-07-07 DIAGNOSIS — N20.1 URETEROLITHIASIS: Primary | ICD-10-CM

## 2021-07-07 LAB
BILIRUB BLD-MCNC: ABNORMAL MG/DL
CLARITY, POC: ABNORMAL
COLOR UR: ABNORMAL
GLUCOSE UR STRIP-MCNC: ABNORMAL MG/DL
KETONES UR QL: NEGATIVE
LEUKOCYTE EST, POC: ABNORMAL
NITRITE UR-MCNC: NEGATIVE MG/ML
PH UR: 5 [PH] (ref 5–8)
PROT UR STRIP-MCNC: ABNORMAL MG/DL
RBC # UR STRIP: ABNORMAL /UL
SARS-COV-2 ORF1AB RESP QL NAA+PROBE: NOT DETECTED
SP GR UR: 1.02 (ref 1–1.03)
UROBILINOGEN UR QL: NORMAL

## 2021-07-07 PROCEDURE — U0004 COV-19 TEST NON-CDC HGH THRU: HCPCS | Performed by: UROLOGY

## 2021-07-07 PROCEDURE — 99214 OFFICE O/P EST MOD 30 MIN: CPT | Performed by: UROLOGY

## 2021-07-07 PROCEDURE — C9803 HOPD COVID-19 SPEC COLLECT: HCPCS | Performed by: UROLOGY

## 2021-07-07 PROCEDURE — 81001 URINALYSIS AUTO W/SCOPE: CPT | Performed by: UROLOGY

## 2021-07-07 RX ORDER — ONDANSETRON 2 MG/ML
4 INJECTION INTRAMUSCULAR; INTRAVENOUS EVERY 6 HOURS PRN
Status: CANCELLED | OUTPATIENT
Start: 2021-07-07

## 2021-07-09 ENCOUNTER — APPOINTMENT (OUTPATIENT)
Dept: GENERAL RADIOLOGY | Facility: HOSPITAL | Age: 61
End: 2021-07-09

## 2021-07-09 ENCOUNTER — HOSPITAL ENCOUNTER (OUTPATIENT)
Facility: HOSPITAL | Age: 61
Setting detail: HOSPITAL OUTPATIENT SURGERY
Discharge: HOME | End: 2021-07-09
Attending: UROLOGY | Admitting: UROLOGY

## 2021-07-09 ENCOUNTER — ANESTHESIA EVENT (OUTPATIENT)
Dept: PERIOP | Facility: HOSPITAL | Age: 61
End: 2021-07-09

## 2021-07-09 ENCOUNTER — ANESTHESIA (OUTPATIENT)
Dept: PERIOP | Facility: HOSPITAL | Age: 61
End: 2021-07-09

## 2021-07-09 VITALS
RESPIRATION RATE: 18 BRPM | HEART RATE: 59 BPM | WEIGHT: 301.37 LBS | OXYGEN SATURATION: 97 % | SYSTOLIC BLOOD PRESSURE: 125 MMHG | HEIGHT: 78 IN | BODY MASS INDEX: 34.87 KG/M2 | TEMPERATURE: 98 F | DIASTOLIC BLOOD PRESSURE: 80 MMHG

## 2021-07-09 DIAGNOSIS — N20.1 URETEROLITHIASIS: ICD-10-CM

## 2021-07-09 DIAGNOSIS — N20.1 LEFT URETERAL STONE: Primary | ICD-10-CM

## 2021-07-09 LAB
GLUCOSE BLDC GLUCOMTR-MCNC: 184 MG/DL (ref 70–130)
GLUCOSE BLDC GLUCOMTR-MCNC: 214 MG/DL (ref 70–130)

## 2021-07-09 PROCEDURE — 74420 UROGRAPHY RTRGR +-KUB: CPT

## 2021-07-09 PROCEDURE — C1894 INTRO/SHEATH, NON-LASER: HCPCS | Performed by: UROLOGY

## 2021-07-09 PROCEDURE — 25010000002 ONDANSETRON PER 1 MG: Performed by: NURSE ANESTHETIST, CERTIFIED REGISTERED

## 2021-07-09 PROCEDURE — 25010000002 IOPAMIDOL 61 % SOLUTION: Performed by: UROLOGY

## 2021-07-09 PROCEDURE — 82962 GLUCOSE BLOOD TEST: CPT

## 2021-07-09 PROCEDURE — C1769 GUIDE WIRE: HCPCS | Performed by: UROLOGY

## 2021-07-09 PROCEDURE — C2617 STENT, NON-COR, TEM W/O DEL: HCPCS | Performed by: UROLOGY

## 2021-07-09 PROCEDURE — 74420 UROGRAPHY RTRGR +-KUB: CPT | Performed by: UROLOGY

## 2021-07-09 PROCEDURE — 25010000002 PROPOFOL 10 MG/ML EMULSION: Performed by: NURSE ANESTHETIST, CERTIFIED REGISTERED

## 2021-07-09 PROCEDURE — 25010000002 LEVOFLOXACIN PER 250 MG: Performed by: UROLOGY

## 2021-07-09 PROCEDURE — C1758 CATHETER, URETERAL: HCPCS | Performed by: UROLOGY

## 2021-07-09 PROCEDURE — 52356 CYSTO/URETERO W/LITHOTRIPSY: CPT | Performed by: UROLOGY

## 2021-07-09 PROCEDURE — 25010000002 MIDAZOLAM PER 1 MG: Performed by: ANESTHESIOLOGY

## 2021-07-09 PROCEDURE — 93010 ELECTROCARDIOGRAM REPORT: CPT | Performed by: INTERNAL MEDICINE

## 2021-07-09 PROCEDURE — 25010000002 FENTANYL CITRATE (PF) 100 MCG/2ML SOLUTION: Performed by: NURSE ANESTHETIST, CERTIFIED REGISTERED

## 2021-07-09 PROCEDURE — 93005 ELECTROCARDIOGRAM TRACING: CPT | Performed by: UROLOGY

## 2021-07-09 DEVICE — URETERAL STENT
Type: IMPLANTABLE DEVICE | Site: URETER | Status: FUNCTIONAL
Brand: PERCUFLEX™ PLUS

## 2021-07-09 RX ORDER — OXYCODONE AND ACETAMINOPHEN 7.5; 325 MG/1; MG/1
1 TABLET ORAL EVERY 4 HOURS PRN
Qty: 12 TABLET | Refills: 0 | Status: SHIPPED | OUTPATIENT
Start: 2021-07-09

## 2021-07-09 RX ORDER — ONDANSETRON 2 MG/ML
INJECTION INTRAMUSCULAR; INTRAVENOUS AS NEEDED
Status: DISCONTINUED | OUTPATIENT
Start: 2021-07-09 | End: 2021-07-09 | Stop reason: SURG

## 2021-07-09 RX ORDER — SODIUM CHLORIDE 0.9 % (FLUSH) 0.9 %
3 SYRINGE (ML) INJECTION EVERY 12 HOURS SCHEDULED
Status: DISCONTINUED | OUTPATIENT
Start: 2021-07-09 | End: 2021-07-09 | Stop reason: HOSPADM

## 2021-07-09 RX ORDER — MAGNESIUM HYDROXIDE 1200 MG/15ML
LIQUID ORAL AS NEEDED
Status: DISCONTINUED | OUTPATIENT
Start: 2021-07-09 | End: 2021-07-09 | Stop reason: HOSPADM

## 2021-07-09 RX ORDER — PROPOFOL 10 MG/ML
VIAL (ML) INTRAVENOUS AS NEEDED
Status: DISCONTINUED | OUTPATIENT
Start: 2021-07-09 | End: 2021-07-09 | Stop reason: SURG

## 2021-07-09 RX ORDER — FENTANYL CITRATE 50 UG/ML
INJECTION, SOLUTION INTRAMUSCULAR; INTRAVENOUS AS NEEDED
Status: DISCONTINUED | OUTPATIENT
Start: 2021-07-09 | End: 2021-07-09 | Stop reason: SURG

## 2021-07-09 RX ORDER — MIDAZOLAM HYDROCHLORIDE 1 MG/ML
1 INJECTION INTRAMUSCULAR; INTRAVENOUS
Status: COMPLETED | OUTPATIENT
Start: 2021-07-09 | End: 2021-07-09

## 2021-07-09 RX ORDER — SODIUM CHLORIDE, SODIUM LACTATE, POTASSIUM CHLORIDE, CALCIUM CHLORIDE 600; 310; 30; 20 MG/100ML; MG/100ML; MG/100ML; MG/100ML
100 INJECTION, SOLUTION INTRAVENOUS CONTINUOUS
Status: DISCONTINUED | OUTPATIENT
Start: 2021-07-09 | End: 2021-07-09 | Stop reason: HOSPADM

## 2021-07-09 RX ORDER — NEOSTIGMINE METHYLSULFATE 5 MG/5 ML
SYRINGE (ML) INTRAVENOUS AS NEEDED
Status: DISCONTINUED | OUTPATIENT
Start: 2021-07-09 | End: 2021-07-09 | Stop reason: SURG

## 2021-07-09 RX ORDER — ACETAMINOPHEN 500 MG
1000 TABLET ORAL ONCE
Status: COMPLETED | OUTPATIENT
Start: 2021-07-09 | End: 2021-07-09

## 2021-07-09 RX ORDER — DEXAMETHASONE SODIUM PHOSPHATE 4 MG/ML
4 INJECTION, SOLUTION INTRA-ARTICULAR; INTRALESIONAL; INTRAMUSCULAR; INTRAVENOUS; SOFT TISSUE ONCE AS NEEDED
Status: DISCONTINUED | OUTPATIENT
Start: 2021-07-09 | End: 2021-07-09 | Stop reason: HOSPADM

## 2021-07-09 RX ORDER — LABETALOL HYDROCHLORIDE 5 MG/ML
5 INJECTION, SOLUTION INTRAVENOUS
Status: DISCONTINUED | OUTPATIENT
Start: 2021-07-09 | End: 2021-07-09 | Stop reason: HOSPADM

## 2021-07-09 RX ORDER — FLUMAZENIL 0.1 MG/ML
0.2 INJECTION INTRAVENOUS AS NEEDED
Status: DISCONTINUED | OUTPATIENT
Start: 2021-07-09 | End: 2021-07-09 | Stop reason: HOSPADM

## 2021-07-09 RX ORDER — OXYCODONE AND ACETAMINOPHEN 7.5; 325 MG/1; MG/1
2 TABLET ORAL EVERY 4 HOURS PRN
Status: DISCONTINUED | OUTPATIENT
Start: 2021-07-09 | End: 2021-07-09 | Stop reason: HOSPADM

## 2021-07-09 RX ORDER — NALOXONE HCL 0.4 MG/ML
0.4 VIAL (ML) INJECTION AS NEEDED
Status: DISCONTINUED | OUTPATIENT
Start: 2021-07-09 | End: 2021-07-09 | Stop reason: HOSPADM

## 2021-07-09 RX ORDER — ROCURONIUM BROMIDE 10 MG/ML
INJECTION, SOLUTION INTRAVENOUS AS NEEDED
Status: DISCONTINUED | OUTPATIENT
Start: 2021-07-09 | End: 2021-07-09 | Stop reason: SURG

## 2021-07-09 RX ORDER — ONDANSETRON 2 MG/ML
4 INJECTION INTRAMUSCULAR; INTRAVENOUS EVERY 6 HOURS PRN
Status: DISCONTINUED | OUTPATIENT
Start: 2021-07-09 | End: 2021-07-09 | Stop reason: HOSPADM

## 2021-07-09 RX ORDER — LEVOFLOXACIN 5 MG/ML
500 INJECTION, SOLUTION INTRAVENOUS ONCE
Status: COMPLETED | OUTPATIENT
Start: 2021-07-09 | End: 2021-07-09

## 2021-07-09 RX ORDER — LIDOCAINE HYDROCHLORIDE 10 MG/ML
0.5 INJECTION, SOLUTION EPIDURAL; INFILTRATION; INTRACAUDAL; PERINEURAL ONCE AS NEEDED
Status: DISCONTINUED | OUTPATIENT
Start: 2021-07-09 | End: 2021-07-09 | Stop reason: HOSPADM

## 2021-07-09 RX ORDER — SODIUM CHLORIDE, SODIUM LACTATE, POTASSIUM CHLORIDE, CALCIUM CHLORIDE 600; 310; 30; 20 MG/100ML; MG/100ML; MG/100ML; MG/100ML
1000 INJECTION, SOLUTION INTRAVENOUS CONTINUOUS
Status: DISCONTINUED | OUTPATIENT
Start: 2021-07-09 | End: 2021-07-09 | Stop reason: HOSPADM

## 2021-07-09 RX ORDER — SODIUM CHLORIDE 0.9 % (FLUSH) 0.9 %
3-10 SYRINGE (ML) INJECTION AS NEEDED
Status: DISCONTINUED | OUTPATIENT
Start: 2021-07-09 | End: 2021-07-09 | Stop reason: HOSPADM

## 2021-07-09 RX ORDER — ONDANSETRON 2 MG/ML
4 INJECTION INTRAMUSCULAR; INTRAVENOUS ONCE AS NEEDED
Status: DISCONTINUED | OUTPATIENT
Start: 2021-07-09 | End: 2021-07-09 | Stop reason: HOSPADM

## 2021-07-09 RX ORDER — IBUPROFEN 600 MG/1
600 TABLET ORAL ONCE AS NEEDED
Status: DISCONTINUED | OUTPATIENT
Start: 2021-07-09 | End: 2021-07-09 | Stop reason: HOSPADM

## 2021-07-09 RX ORDER — SODIUM CHLORIDE 0.9 % (FLUSH) 0.9 %
3 SYRINGE (ML) INJECTION AS NEEDED
Status: DISCONTINUED | OUTPATIENT
Start: 2021-07-09 | End: 2021-07-09 | Stop reason: HOSPADM

## 2021-07-09 RX ORDER — OXYCODONE AND ACETAMINOPHEN 10; 325 MG/1; MG/1
1 TABLET ORAL ONCE AS NEEDED
Status: COMPLETED | OUTPATIENT
Start: 2021-07-09 | End: 2021-07-09

## 2021-07-09 RX ORDER — FENTANYL CITRATE 50 UG/ML
25 INJECTION, SOLUTION INTRAMUSCULAR; INTRAVENOUS
Status: DISCONTINUED | OUTPATIENT
Start: 2021-07-09 | End: 2021-07-09 | Stop reason: HOSPADM

## 2021-07-09 RX ADMIN — MIDAZOLAM 1 MG: 1 INJECTION INTRAMUSCULAR; INTRAVENOUS at 11:21

## 2021-07-09 RX ADMIN — PROPOFOL 200 MG: 10 INJECTION, EMULSION INTRAVENOUS at 12:03

## 2021-07-09 RX ADMIN — Medication 3.5 MG: at 12:31

## 2021-07-09 RX ADMIN — LEVOFLOXACIN 500 MG: 500 INJECTION, SOLUTION INTRAVENOUS at 11:06

## 2021-07-09 RX ADMIN — ONDANSETRON 4 MG: 2 INJECTION INTRAMUSCULAR; INTRAVENOUS at 12:31

## 2021-07-09 RX ADMIN — SODIUM CHLORIDE, POTASSIUM CHLORIDE, SODIUM LACTATE AND CALCIUM CHLORIDE 1000 ML: 600; 310; 30; 20 INJECTION, SOLUTION INTRAVENOUS at 09:47

## 2021-07-09 RX ADMIN — ROCURONIUM BROMIDE 40 MG: 10 INJECTION INTRAVENOUS at 12:03

## 2021-07-09 RX ADMIN — OXYCODONE AND ACETAMINOPHEN 1 TABLET: 325; 10 TABLET ORAL at 13:06

## 2021-07-09 RX ADMIN — FENTANYL CITRATE 100 MCG: 50 INJECTION, SOLUTION INTRAMUSCULAR; INTRAVENOUS at 12:03

## 2021-07-09 RX ADMIN — ACETAMINOPHEN 1000 MG: 500 TABLET, FILM COATED ORAL at 11:06

## 2021-07-09 RX ADMIN — GLYCOPYRROLATE 0.4 MG: 0.2 INJECTION, SOLUTION INTRAMUSCULAR; INTRAVENOUS at 12:31

## 2021-07-09 RX ADMIN — MIDAZOLAM 1 MG: 1 INJECTION INTRAMUSCULAR; INTRAVENOUS at 11:06

## 2021-07-09 NOTE — ANESTHESIA PROCEDURE NOTES
Airway  Date/Time: 7/9/2021 12:05 PM  Airway not difficult    General Information and Staff    Patient location during procedure: OR  CRNA: Sabino Valente CRNA    Indications and Patient Condition  Indications for airway management: airway protection    Preoxygenated: yes  Mask difficulty assessment: 0 - not attempted    Final Airway Details  Final airway type: endotracheal airway      Successful airway: ETT  Cuffed: yes   Successful intubation technique: direct laryngoscopy  Endotracheal tube insertion site: oral  Blade: Edmund  Blade size: 3  ETT size (mm): 7.5  Cormack-Lehane Classification: grade I - full view of glottis  Placement verified by: chest auscultation and capnometry   Cuff volume (mL): 6  Measured from: lips  ETT/EBT  to lips (cm): 22  Number of attempts at approach: 1  Assessment: lips, teeth, and gum same as pre-op and atraumatic intubation    Additional Comments  ATRAUMATIC INTUBATION

## 2021-07-09 NOTE — DISCHARGE INSTRUCTIONS

## 2021-07-09 NOTE — BRIEF OP NOTE
Radiographic performance and interpretation note    Indication: left ureter stone    Procedure:  1. Left retrograde pyelogram    Interpretation:  imaging revealed the left stent in good position and the stone was noted next to the proximal portion of the stent. Retrograde pyelogram was performed through a 10-FR dual lumen catheter placed in the distal left ureter. Mildly dilated proximal ureter and renal pelvis. Stone visible.

## 2021-07-09 NOTE — ANESTHESIA POSTPROCEDURE EVALUATION
"Patient: Hermilo Mccord    Procedure Summary     Date: 07/09/21 Room / Location:  [facility] 01 /  [facility]    Anesthesia Start: 1159 Anesthesia Stop: 1242    Procedure: URETEROSCOPY LASER LITHOTRIPSY WITH STENT INSERTION (Left Ureter) Diagnosis:       Ureterolithiasis      (Ureterolithiasis [N20.1])    Surgeons: Jamil Santiago MD Provider: Sabino Valente CRNA    Anesthesia Type: general ASA Status: 2          Anesthesia Type: general    Vitals  Vitals Value Taken Time   /79 07/09/21 1258   Temp 98 °F (36.7 °C) 07/09/21 1255   Pulse 63 07/09/21 1258   Resp 18 07/09/21 1258   SpO2 99 % 07/09/21 1258           Post Anesthesia Care and Evaluation    PONV Status: none  Comments: Patient d/c from PACU prior to anes eval based on Luis score.  Please see RN notes for details of d/c criteria.    Blood pressure 125/80, pulse 59, temperature 98 °F (36.7 °C), temperature source Temporal, resp. rate 18, height 197.5 cm (77.76\"), weight (!) 137 kg (301 lb 5.9 oz), SpO2 97 %.          "

## 2021-07-09 NOTE — OP NOTE
URETEROSCOPY LASER LITHOTRIPSY WITH STENT INSERTION  Procedure Note    Hermilo Mccord  7/9/2021    Pre-op Diagnosis:   Left proximal ureteral stone    Post-op Diagnosis:     Same    Procedure(s):  1. Cystoscopy with left stent removal  2. Performance and interpretation of a left retrograde pyelogram  3. Left ureteroscopy with laser lithotripsy and stent placement    Anesthesia: General    Staff:   Circulator: Anna Massey RN; Mariel Stephenson RN  Scrub Person: Jayda Ortiz; Gertrudis Hernandez    Estimated Blood Loss: minimal    Specimens:                None      Drains:   Ureteral Drain/Stent Left ureter 168 Fr. (Active)   Site Assessment MICHEL 06/25/21 0734       Ureteral Drain/Stent Left ureter 6 Fr. (Active)       [REMOVED] Urethral Catheter Silicone 3.6 Fr. (Removed)   Daily Indications Placement by  physician 06/24/21 2211   Site Assessment Clean;Skin intact 06/24/21 2211   Collection Container Standard drainage bag 06/24/21 2211   Securement Method Securing device 06/24/21 2211   Catheter care complete Yes 06/24/21 2211   Output (mL) 1000 mL 06/25/21 0920       Findings: Routine flexible ureteroscopy with laser lithotripsy of a mildly impacted left proximal ureteral stone.    Complications: None    Indications: Deviously stented large obstructing left proximal ureteral stone    Description of Procedure:     The patient was greeted in the holding area.  We discussed the procedure including risks, benefits, alternatives and complications.  All questions were answered.  Informed consent was obtained.    Preoperative antibiotic was administered.  The patient was transported back to the operating suite.  General anesthesia was set followed by airway management.  Patient was prepped and sterilely draped in dorsal lithotomy position.  A formal timeout was performed.    I proceeded with rigid cystourethroscopy.   fluoroscopy was performed noting the left stent in place.  Graspers were used to pull the  stent out to the meatus.  A Glidewire was passed through the stent under fluoroscopic guidance and noted to curl within the kidney.  The stent was removed.  A 10 [place]alan dual-lumen catheter was placed over the Glidewire.  A retrograde pyelogram was performed.  Please see separate dictated note.  A second Glidewire was placed through the 10 [place]alan dual-lumen catheter.  The catheter was removed leaving both wires in place.  A ureteral access sheath was placed over 1 of the Glidewire and advanced under fluoroscopic guidance.  Flexible ureteroscopy was performed.  The stone was visualized in the proximal ureter.  Laser lithotripsy was performed.  The stone was dusted. Pan-nephroscopy was performed. There were no remaining stone fragments noted under direct vision or upon fluoroscopy.  The ureter was visualized in its entirety under direct visualization with the scope as the ureteral access sheath was removed.  There were no injuries.    The Glidewire was left in place and the scope was removed.  A 6 Guatemalan by 28 cm stent was placed with a dangler string.  The bladder was emptied after the distal curl was noted in position using cystoscopy.  The scope was removed.  The dangler string was taped to the penis.    The  patient was awoken from the procedure and transported in stable condition to the PACU.    Disposition: The patient will be discharged to home when he meets criteria.  I have instructed him to remove his stent on Monday morning.  Follow-up in my office in 4 weeks with a limited left renal ultrasound and KUB.    Jamil Santiago MD     Date: 7/9/2021  Time: 12:48 CDT

## 2021-07-11 LAB
QT INTERVAL: 406 MS
QTC INTERVAL: 429 MS

## 2021-07-12 ENCOUNTER — TELEPHONE (OUTPATIENT)
Dept: UROLOGY | Facility: CLINIC | Age: 61
End: 2021-07-12

## 2021-07-12 NOTE — TELEPHONE ENCOUNTER
----- Message from Susan Weeks sent at 7/9/2021  3:49 PM CDT -----    ----- Message -----  From: Jamil Santiago MD  Sent: 7/9/2021   3:21 PM CDT  To: Susan Weeks    Patient has stent on a string. Have patient remove on Monday 7/12/21.

## 2021-07-12 NOTE — TELEPHONE ENCOUNTER
Called pt and reminded them to remove their string stent and if they had any trouble to pleas call our office. Pt verbalized understanding.

## 2021-08-18 ENCOUNTER — HOSPITAL ENCOUNTER (OUTPATIENT)
Dept: GENERAL RADIOLOGY | Facility: HOSPITAL | Age: 61
Discharge: HOME | End: 2021-08-18

## 2021-08-18 ENCOUNTER — OFFICE VISIT (OUTPATIENT)
Dept: UROLOGY | Facility: CLINIC | Age: 61
End: 2021-08-18

## 2021-08-18 ENCOUNTER — HOSPITAL ENCOUNTER (OUTPATIENT)
Dept: ULTRASOUND IMAGING | Facility: HOSPITAL | Age: 61
Discharge: HOME | End: 2021-08-18

## 2021-08-18 ENCOUNTER — APPOINTMENT (OUTPATIENT)
Dept: GENERAL RADIOLOGY | Facility: HOSPITAL | Age: 61
End: 2021-08-18

## 2021-08-18 VITALS — BODY MASS INDEX: 34.71 KG/M2 | TEMPERATURE: 96.5 F | WEIGHT: 300 LBS | HEIGHT: 78 IN

## 2021-08-18 DIAGNOSIS — N20.1 LEFT URETERAL STONE: ICD-10-CM

## 2021-08-18 DIAGNOSIS — N20.1 LEFT URETERAL STONE: Primary | ICD-10-CM

## 2021-08-18 PROCEDURE — 74018 RADEX ABDOMEN 1 VIEW: CPT

## 2021-08-18 PROCEDURE — 76775 US EXAM ABDO BACK WALL LIM: CPT

## 2021-08-18 PROCEDURE — 99213 OFFICE O/P EST LOW 20 MIN: CPT | Performed by: UROLOGY

## 2021-08-18 NOTE — PROGRESS NOTES
Subjective    Mr. Mccord is 60 y.o. male    Chief Complaint: ureter stone s/p URS     History of Present Illness     Doing well since surgery. No pain. That was first stone.    The following portions of the patient's history were reviewed and updated as appropriate: allergies, current medications, past family history, past medical history, past social history, past surgical history and problem list.    Review of Systems   Constitutional: Negative for chills and fever.   Gastrointestinal: Negative for abdominal pain, anal bleeding and blood in stool.   Genitourinary: Negative for dysuria and hematuria.         Current Outpatient Medications:   •  aspirin 81 MG chewable tablet, Chew 81 mg Daily., Disp: , Rfl:   •  desloratadine (CLARINEX) 5 MG tablet, Take 5 mg by mouth Daily., Disp: , Rfl:   •  esomeprazole (nexIUM) 40 MG capsule, Take 40 mg by mouth Every Morning Before Breakfast., Disp: , Rfl:   •  glimepiride (AMARYL) 2 MG tablet, Take 2 mg by mouth Every Morning Before Breakfast., Disp: , Rfl:   •  hydroCHLOROthiazide (HYDRODIURIL) 25 MG tablet, Take 25 mg by mouth Daily., Disp: , Rfl:   •  simvastatin (ZOCOR) 40 MG tablet, Take 40 mg by mouth Every Night., Disp: , Rfl:   •  sitaGLIPtin-metFORMIN (Janumet)  MG per tablet, Take 1 tablet by mouth 2 (Two) Times a Day With Meals., Disp: , Rfl:   •  oxyCODONE-acetaminophen (Percocet) 7.5-325 MG per tablet, Take 1 tablet by mouth Every 4 (Four) Hours As Needed for Moderate Pain ., Disp: 12 tablet, Rfl: 0    Past Medical History:   Diagnosis Date   • Diabetes mellitus (CMS/HCC)    • GERD (gastroesophageal reflux disease)    • Hyperlipidemia    • Hypertension    • Kidney stone    • Numbness     left-sided intermittently        Past Surgical History:   Procedure Laterality Date   • APPENDECTOMY     • CARPAL TUNNEL RELEASE Bilateral    • CYSTOSCOPY, RETROGRADE PYELOGRAM AND STENT INSERTION Left 6/24/2021    Procedure: CYSTOSCOPY RETROGRADE PYELOGRAM AND STENT  "INSERTION;  Surgeon: Jamil Santiago MD;  Location:  [facility] OR;  Service: Urology;  Laterality: Left;   • ELBOW PROCEDURE Right    • KNEE SURGERY Bilateral    • SINUS SURGERY     • URETEROSCOPY LASER LITHOTRIPSY WITH STENT INSERTION Left 7/9/2021    Procedure: URETEROSCOPY LASER LITHOTRIPSY WITH STENT INSERTION;  Surgeon: Jamil Santiago MD;  Location:  [facility] OR;  Service: Urology;  Laterality: Left;       Social History     Socioeconomic History   • Marital status:      Spouse name: Not on file   • Number of children: Not on file   • Years of education: Not on file   • Highest education level: Not on file   Tobacco Use   • Smoking status: Never Smoker   • Smokeless tobacco: Never Used   Vaping Use   • Vaping Use: Never used   Substance and Sexual Activity   • Alcohol use: Yes     Comment: OCC   • Drug use: Not Currently   • Sexual activity: Defer       History reviewed. No pertinent family history.    Objective    Temp 96.5 °F (35.8 °C)   Ht 198.1 cm (78\")   Wt 136 kg (300 lb)   BMI 34.67 kg/m²     Physical Exam  Constitutional:       Appearance: Normal appearance.   Neurological:      Mental Status: He is alert.     Ultrasound, KUB and initial CT reviewed.        Results for orders placed or performed in visit on 08/18/21   POC Urinalysis Dipstick, Multipro    Specimen: Urine   Result Value Ref Range    Color Yellow Yellow, Straw, Dark Yellow, Stacey    Clarity, UA Clear Clear    Glucose,  mg/dL (A) Negative, 1000 mg/dL (3+) mg/dL    Bilirubin Negative Negative    Ketones, UA Trace (A) Negative    Specific Gravity  5.000 (A) 1.005 - 1.030    Blood, UA Negative Negative    pH, Urine 5.0 (A) 5.0 - 8.0    Protein, POC Negative Negative mg/dL    Urobilinogen, UA Normal Normal    Nitrite, UA Negative Negative    Leukocytes Negative Negative     Assessment and Plan    Diagnoses and all orders for this visit:    1. Left ureteral stone (Primary)    FU PRN    Patient will follow up in early September " with PCP to follow up on serum creatinine. It was trending down during his admission. NO hydro noted on current US.

## 2021-12-01 ENCOUNTER — CLINICAL SUPPORT (OUTPATIENT)
Dept: URGENT CARE | Facility: CLINIC | Age: 61
End: 2021-12-01
Payer: COMMERCIAL

## 2021-12-01 DIAGNOSIS — Z11.59 SCREENING FOR VIRAL DISEASE: Primary | ICD-10-CM

## 2021-12-01 LAB
CTP QC/QA: YES
SARS-COV-2 RDRP RESP QL NAA+PROBE: NEGATIVE

## 2021-12-01 PROCEDURE — U0002: ICD-10-PCS | Mod: QW,S$GLB,, | Performed by: INTERNAL MEDICINE

## 2021-12-01 PROCEDURE — U0002 COVID-19 LAB TEST NON-CDC: HCPCS | Mod: QW,S$GLB,, | Performed by: INTERNAL MEDICINE

## 2023-10-31 ENCOUNTER — OFFICE VISIT (OUTPATIENT)
Dept: NEUROSURGERY | Facility: CLINIC | Age: 63
End: 2023-10-31
Payer: COMMERCIAL

## 2023-10-31 ENCOUNTER — HOSPITAL ENCOUNTER (OUTPATIENT)
Dept: GENERAL RADIOLOGY | Facility: HOSPITAL | Age: 63
Discharge: HOME | End: 2023-10-31
Admitting: NURSE PRACTITIONER
Payer: COMMERCIAL

## 2023-10-31 VITALS — WEIGHT: 302 LBS | BODY MASS INDEX: 34.94 KG/M2 | HEIGHT: 78 IN

## 2023-10-31 DIAGNOSIS — M79.604 PAIN IN BOTH LOWER EXTREMITIES: ICD-10-CM

## 2023-10-31 DIAGNOSIS — R20.2 NUMBNESS AND TINGLING OF LEFT LOWER EXTREMITY: ICD-10-CM

## 2023-10-31 DIAGNOSIS — R20.0 NUMBNESS AND TINGLING OF LEFT LOWER EXTREMITY: ICD-10-CM

## 2023-10-31 DIAGNOSIS — M54.50 LUMBAR BACK PAIN: Primary | ICD-10-CM

## 2023-10-31 DIAGNOSIS — E66.09 CLASS 1 OBESITY DUE TO EXCESS CALORIES WITH SERIOUS COMORBIDITY AND BODY MASS INDEX (BMI) OF 34.0 TO 34.9 IN ADULT: ICD-10-CM

## 2023-10-31 DIAGNOSIS — M79.605 PAIN IN BOTH LOWER EXTREMITIES: ICD-10-CM

## 2023-10-31 DIAGNOSIS — M54.50 LUMBAR BACK PAIN: ICD-10-CM

## 2023-10-31 PROCEDURE — 99204 OFFICE O/P NEW MOD 45 MIN: CPT | Performed by: NURSE PRACTITIONER

## 2023-10-31 PROCEDURE — 72100 X-RAY EXAM L-S SPINE 2/3 VWS: CPT

## 2023-10-31 RX ORDER — HYDROCODONE BITARTRATE AND ACETAMINOPHEN 10; 325 MG/1; MG/1
TABLET ORAL
COMMUNITY
Start: 2023-09-18

## 2023-10-31 RX ORDER — IBUPROFEN 800 MG/1
TABLET ORAL
COMMUNITY
Start: 2023-08-16 | End: 2023-10-31

## 2023-10-31 RX ORDER — EMPAGLIFLOZIN 25 MG/1
25 TABLET, FILM COATED ORAL DAILY
COMMUNITY

## 2023-10-31 RX ORDER — SACUBITRIL AND VALSARTAN 49; 51 MG/1; MG/1
1 TABLET, FILM COATED ORAL 2 TIMES DAILY
COMMUNITY

## 2023-10-31 RX ORDER — CYCLOBENZAPRINE HCL 10 MG
10 TABLET ORAL NIGHTLY PRN
Qty: 30 TABLET | Refills: 0 | Status: SHIPPED | OUTPATIENT
Start: 2023-10-31

## 2023-10-31 RX ORDER — MELOXICAM 15 MG/1
TABLET ORAL
COMMUNITY

## 2023-10-31 RX ORDER — SIMVASTATIN 40 MG
1 TABLET ORAL
COMMUNITY

## 2023-10-31 RX ORDER — GABAPENTIN 100 MG/1
CAPSULE ORAL
Qty: 108 CAPSULE | Refills: 0 | Status: SHIPPED | OUTPATIENT
Start: 2023-10-31 | End: 2023-11-14

## 2023-10-31 RX ORDER — HYDROCHLOROTHIAZIDE 12.5 MG/1
TABLET ORAL
COMMUNITY

## 2023-10-31 NOTE — PROGRESS NOTES
Primary Care Provider: Bronwyn Lay APRN  Requesting Provider: Bronwyn Lay APRN    Chief Complaint:   Chief Complaint   Patient presents with    Back Pain     Pt is here with complaints of back pain and bilateral leg pain.        History of Present Illness  Consultation at the request of CAROLA Murphy.    HPI:  Hermilo Mccord is a 62 y.o. male who presents today with a complaint of lumbar back and bilateral leg pain, 70% legs, 30% back.    Gradual onset of lumbar back pain over the past 10+ years that is progressively worsened over the past 3 months.  He denies injury or prior evaluation.    His lumbar back pain is currently constant, waxing and waning in severity.  His lumbar back pain intermittently radiates to the posterior right thigh that extends to the ankle, as well as to the left posterior thigh that extends to the mid leg.  He additionally reports intermittent numbness and tingling to the bilateral feet in a stocking glove distribution from the ankles.  His back leg pain worsen with standing and walking and resolves to some extent with lying down, sitting, and use of meloxicam and hydrocodone.  He denies lower extremity weakness, need for assist while ambulating, or falls.  Mr. Mccord additionally denies fevers, chills, night sweats, unexplained weight loss, saddle anesthesia, or bowel or bladder dysfunction. He currently rates the severity of his symptoms 7/10.  No additional concerns at this time.      Mr. Mccord has not completed a dedicated course of physician directed physical therapy, massage care, chiropractic care, nor been evaluated by pain management.     Oswestry Disability Index = 36%   Score   Pain Intensity Moderate pain-2   Personal Care Look after myslef but very painful-1   Lifting Medium weights off a table-3   Walking Pain prevents > 100 yards-3   Sitting Sit as long as I like-0   Standing Pain limits standing to < 10 min-4   Sleeping Can only sleep < 6  hrs-2   Sex Life (if applicable) Sex causes extra pain-2   Social Life Social life normal, but increases pain-1   Traveling Travel gives me extra pain-1   ([place] et al, 1980)    SCORE INTERPRETATION OF THE OSWESTRY LBP DISABILITY QUESTIONNAIRE     20-40% Moderate disability.  This group experiences more pain and problems with sitting, lifting, and standing.  Travel and social life are more difficult and they may well be off work. Personal care, sexual activity, and sleeping are not grossly affected, and the back condition can usually be managed by conservative means.      ROS  Review of Systems   Constitutional: Negative.    HENT: Negative.     Eyes: Negative.    Respiratory: Negative.     Cardiovascular: Negative.    Gastrointestinal: Negative.    Endocrine: Negative.    Genitourinary: Negative.    Musculoskeletal:  Positive for back pain, neck pain and neck stiffness.   Skin: Negative.    Allergic/Immunologic: Negative.    Neurological:  Positive for numbness.   Hematological: Negative.    Psychiatric/Behavioral: Negative.     All other systems reviewed and are negative.    Past Medical History:   Diagnosis Date    Diabetes mellitus     GERD (gastroesophageal reflux disease)     Hyperlipidemia     Hypertension     Kidney stone     Numbness     left-sided intermittently      Past Surgical History:   Procedure Laterality Date    APPENDECTOMY      CARPAL TUNNEL RELEASE Bilateral     CYSTOSCOPY, RETROGRADE PYELOGRAM AND STENT INSERTION Left 6/24/2021    Procedure: CYSTOSCOPY RETROGRADE PYELOGRAM AND STENT INSERTION;  Surgeon: Jamil Santiago MD;  Location: [facility] OR;  Service: Urology;  Laterality: Left;    ELBOW PROCEDURE Right     KNEE SURGERY Bilateral     SINUS SURGERY      URETEROSCOPY LASER LITHOTRIPSY WITH STENT INSERTION Left 7/9/2021    Procedure: URETEROSCOPY LASER LITHOTRIPSY WITH STENT INSERTION;  Surgeon: Jamil Santiago MD;  Location: [facility] OR;  Service: Urology;  Laterality: Left;  "    Family History: family history is not on file.    Social History:  reports that he has never smoked. He has never used smokeless tobacco. He reports current alcohol use. He reports that he does not currently use drugs.    Medications:    Current Outpatient Medications:     HYDROcodone-acetaminophen (NORCO)  MG per tablet, , Disp: , Rfl:     aspirin 81 MG chewable tablet, Chew 81 mg Daily., Disp: , Rfl:     cyclobenzaprine (FLEXERIL) 10 MG tablet, Take 1 tablet by mouth At Night As Needed for Muscle Spasms., Disp: 30 tablet, Rfl: 0    desloratadine (CLARINEX) 5 MG tablet, Take 5 mg by mouth Daily., Disp: , Rfl:     Entresto 49-51 MG tablet, Take 1 tablet by mouth 2 (Two) Times a Day., Disp: , Rfl:     esomeprazole (nexIUM) 40 MG capsule, Take 40 mg by mouth Every Morning Before Breakfast., Disp: , Rfl:     gabapentin (NEURONTIN) 100 MG capsule, Take 1 capsule by mouth 3 (Three) Times a Day for 3 days, THEN 3 capsules 3 (Three) Times a Day for 11 days., Disp: 108 capsule, Rfl: 0    glimepiride (AMARYL) 2 MG tablet, Take 2 mg by mouth Every Morning Before Breakfast., Disp: , Rfl:     hydroCHLOROthiazide (HYDRODIURIL) 12.5 MG tablet, , Disp: , Rfl:     hydroCHLOROthiazide (HYDRODIURIL) 25 MG tablet, Take 25 mg by mouth Daily., Disp: , Rfl:     Jardiance 25 MG tablet tablet, Take 1 tablet by mouth Daily., Disp: , Rfl:     meloxicam (MOBIC) 15 MG tablet, TAKE 1 TABLET BY MOUTH EVERY DAY WITH A MEAL, Disp: , Rfl:     simvastatin (ZOCOR) 40 MG tablet, Take 40 mg by mouth Every Night., Disp: , Rfl:     simvastatin (ZOCOR) 40 MG tablet, Take 1 tablet by mouth every night at bedtime., Disp: , Rfl:     Allergies:  Patient has no known allergies.    Objective   Ht 198.1 cm (78\")   Wt (!) 137 kg (302 lb)   BMI 34.90 kg/m²   Physical Exam  Vitals and nursing note reviewed.   Constitutional:       General: He is not in acute distress.     Appearance: Normal appearance. He is well-developed and well-groomed. He is obese. " He is not ill-appearing, toxic-appearing or diaphoretic.      Comments: BMI 34.90   HENT:      Head: Normocephalic and atraumatic.      Right Ear: Hearing normal.      Left Ear: Hearing normal.   Eyes:      Extraocular Movements: EOM normal.      Conjunctiva/sclera: Conjunctivae normal.      Pupils: Pupils are equal, round, and reactive to light.   Neck:      Trachea: Trachea normal.   Cardiovascular:      Rate and Rhythm: Normal rate and regular rhythm.   Pulmonary:      Effort: Pulmonary effort is normal. No tachypnea, bradypnea, accessory muscle usage or respiratory distress.   Abdominal:      Palpations: Abdomen is soft.   Musculoskeletal:      Cervical back: Full passive range of motion without pain and neck supple.   Skin:     General: Skin is warm and dry.   Neurological:      Mental Status: He is alert and oriented to person, place, and time.      GCS: GCS eye subscore is 4. GCS verbal subscore is 5. GCS motor subscore is 6.      Deep Tendon Reflexes:      Reflex Scores:       Tricep reflexes are 2+ on the right side and 2+ on the left side.       Bicep reflexes are 2+ on the right side and 2+ on the left side.       Brachioradialis reflexes are 2+ on the right side and 2+ on the left side.       Patellar reflexes are 1+ on the right side and 1+ on the left side.       Achilles reflexes are 0 on the right side and 0 on the left side.  Psychiatric:         Speech: Speech normal.         Behavior: Behavior normal. Behavior is cooperative.       Neurologic Exam     Mental Status   Oriented to person, place, and time.   Attention: normal. Concentration: normal.   Speech: speech is normal   Level of consciousness: alert    Cranial Nerves     CN II   Visual fields full to confrontation.     CN III, IV, VI   Pupils are equal, round, and reactive to light.  Extraocular motions are normal.     CN V   Facial sensation intact.     CN VII   Facial expression full, symmetric.     CN VIII   CN VIII normal.     CN IX, X    CN IX normal.     CN XI   CN XI normal.     Motor Exam   Right arm tone: normal  Left arm tone: normal  Right leg tone: normal  Left leg tone: normal    Strength   Right deltoid: 5/5  Left deltoid: 5/5  Right biceps: 5/5  Left biceps: 5/5  Right triceps: 5/5  Left triceps: 5/5  Right wrist extension: 5/5  Left wrist extension: 5/5  Right iliopsoas: 5/5  Left iliopsoas: 5/5  Right quadriceps: 5/5  Left quadriceps: 5/5  Right anterior tibial: 5/5  Left anterior tibial: 5/5  Right gastroc: 5/5  Left gastroc: 5/5  Right EHL 5/5  Left EHL 5/5       Sensory Exam   Right arm light touch: normal  Left arm light touch: normal  Sensory deficit distribution on right: S1  Sensory deficit distribution on left: S1    Gait, Coordination, and Reflexes     Tremor   Resting tremor: absent  Intention tremor: absent  Action tremor: absent    Reflexes   Right brachioradialis: 2+  Left brachioradialis: 2+  Right biceps: 2+  Left biceps: 2+  Right triceps: 2+  Left triceps: 2+  Right patellar: 1+  Left patellar: 1+  Right achilles: 0  Left achilles: 0  Right plantar: normal  Left plantar: normal  Right Elder: absent  Left Elder: absent  Right ankle clonus: absent  Left ankle clonus: absent  Right pendular knee jerk: absent  Left pendular knee jerk: absent  Short stepping gait      Imaging: (independent review and interpretation)  8/8/2023       ASSESSMENT and PLAN  Hermilo Mccord is a 62 y.o. male with a significant medical history of hypertension, hyperlipidemia, GERD, diabetes, and obesity.  He presents with a new problem of lumbar back and bilateral leg pain in the S1 distribution, 70% legs, 30% back. CHOCO: 36.  Physical exam findings of lower extremity hyporeflexia with absent bilateral Achilles reflexes and a short stepping gait.  His imaging shows no evidence of acute fractures or malalignment, moderate degenerative disc disease.    RECOMMENDATIONS ...  Lumbar back pain  Bilateral leg pain in the S1  distribution  Differential diagnosis include, but are not limited to degenerative facet arthropathy, lumbar stenosis with neurogenic claudication, Lumbar stenosis with bilateral lower extremity radiculopathy, or herniated lumbar intervertebral disc.     For further evaluation we will proceed today by obtaining  2 view lateral x-rays of the lumbar spine upright and supine  to assess for areas of instability.  As a means of first-line conservative management for lumbar back pain, we will send him for a dedicated course of physician directed physical therapy; Rx provided.    I will also provide him with a trial course of Flexeril and a titrating course of gabapentin.  Benefits, risk, adverse effects, and use discussed.  We will have him return for reassessment with me after completion of physical therapy.  We further discussed cardinal symptoms to watch for which include, but not limited to worsening lumbar back pain, weakness, pain radiating down below the knees, gait disturbances, bowel or bladder incontinence, or any additional concerns.  Should he develop any of these symptoms he should contact our office immediately and we will have him return for reassessment. Hermilo agrees with this plan of care.    Class 1 obesity (BMI 30.0-34.9) due to excessive calories with serious comorbidities BMI of 34.0-34.9 in adult  Body mass index is 34.9 kg/m². Information on the DASH diet provided in the AVS.  We will continue to provided diet and exercise information with the goal of weight loss at each scheduled appointment.     Diagnoses and all orders for this visit:    1. Lumbar back pain (Primary)  -     cyclobenzaprine (FLEXERIL) 10 MG tablet; Take 1 tablet by mouth At Night As Needed for Muscle Spasms.  Dispense: 30 tablet; Refill: 0  -     gabapentin (NEURONTIN) 100 MG capsule; Take 1 capsule by mouth 3 (Three) Times a Day for 3 days, THEN 3 capsules 3 (Three) Times a Day for 11 days.  Dispense: 108 capsule; Refill: 0  -      Ambulatory Referral to Physical Therapy Evaluate and treat (2 to 3 times weekly for 6 weeks)  -     XR Spine Lumbar 2 or 3 View; Future    2. Pain in both lower extremities  -     cyclobenzaprine (FLEXERIL) 10 MG tablet; Take 1 tablet by mouth At Night As Needed for Muscle Spasms.  Dispense: 30 tablet; Refill: 0  -     gabapentin (NEURONTIN) 100 MG capsule; Take 1 capsule by mouth 3 (Three) Times a Day for 3 days, THEN 3 capsules 3 (Three) Times a Day for 11 days.  Dispense: 108 capsule; Refill: 0  -     Ambulatory Referral to Physical Therapy Evaluate and treat (2 to 3 times weekly for 6 weeks)  -     XR Spine Lumbar 2 or 3 View; Future    3. Numbness and tingling of left lower extremity    4. Class 1 obesity due to excess calories with serious comorbidity and body mass index (BMI) of 34.0 to 34.9 in adult      Return for FOLLOW WITH VIOLETA AFTER COMPLETION OF PT.    Thank you for this Consultation and the opportunity to participate in [place]'s care.    Sincerely,  Violeta Danielle, APRN

## 2023-10-31 NOTE — PATIENT INSTRUCTIONS
"DASH Eating Plan  DASH stands for Dietary Approaches to Stop Hypertension. The DASH eating plan is a healthy eating plan that has been shown to:  Reduce high blood pressure (hypertension).  Reduce your risk for type 2 diabetes, heart disease, and stroke.  Help with weight loss.  What are tips for following this plan?  Reading food labels  Check food labels for the amount of salt (sodium) per serving. Choose foods with less than 5 percent of the Daily Value of sodium. Generally, foods with less than 300 milligrams (mg) of sodium per serving fit into this eating plan.  To find whole grains, look for the word \"whole\" as the first word in the ingredient list.  Shopping  Buy products labeled as \"low-sodium\" or \"no salt added.\"  Buy fresh foods. Avoid canned foods and pre-made or frozen meals.  Cooking  Avoid adding salt when cooking. Use salt-free seasonings or herbs instead of table salt or sea salt. Check with your health care provider or pharmacist before using salt substitutes.  Do not wright foods. Cook foods using healthy methods such as baking, boiling, grilling, roasting, and broiling instead.  Cook with heart-healthy oils, such as olive, canola, avocado, soybean, or sunflower oil.  Meal planning    Eat a balanced diet that includes:  4 or more servings of fruits and 4 or more servings of vegetables each day. Try to fill one-half of your plate with fruits and vegetables.  6-8 servings of whole grains each day.  Less than 6 oz (170 g) of lean meat, poultry, or fish each day. A 3-oz (85-g) serving of meat is about the same size as a deck of cards. One egg equals 1 oz (28 g).  2-3 servings of low-fat dairy each day. One serving is 1 cup (237 mL).  1 serving of nuts, seeds, or beans 5 times each week.  2-3 servings of heart-healthy fats. Healthy fats called omega-3 fatty acids are found in foods such as walnuts, flaxseeds, fortified milks, and eggs. These fats are also found in cold-water fish, such as sardines, salmon, " and mackerel.  Limit how much you eat of:  Canned or prepackaged foods.  Food that is high in trans fat, such as some fried foods.  Food that is high in saturated fat, such as fatty meat.  Desserts and other sweets, sugary drinks, and other foods with added sugar.  Full-fat dairy products.  Do not salt foods before eating.  Do not eat more than 4 egg yolks a week.  Try to eat at least 2 vegetarian meals a week.  Eat more home-cooked food and less restaurant, buffet, and fast food.  Lifestyle  When eating at a restaurant, ask that your food be prepared with less salt or no salt, if possible.  If you drink alcohol:  Limit how much you use to:  0-1 drink a day for women who are not pregnant.  0-2 drinks a day for men.  Be aware of how much alcohol is in your drink. In the [place] one drink equals one 12 oz bottle of beer (355 mL), one 5 oz glass of wine (148 mL), or one 1½ oz glass of hard liquor (44 mL).  General information  Avoid eating more than 2,300 mg of salt a day. If you have hypertension, you may need to reduce your sodium intake to 1,500 mg a day.  Work with your health care provider to maintain a healthy body weight or to lose weight. Ask what an ideal weight is for you.  Get at least 30 minutes of exercise that causes your heart to beat faster (aerobic exercise) most days of the week. Activities may include walking, swimming, or biking.  Work with your health care provider or dietitian to adjust your eating plan to your individual calorie needs.  What foods should I eat?  Fruits  All fresh, dried, or frozen fruit. Canned fruit in natural juice (without added sugar).  Vegetables  Fresh or frozen vegetables (raw, steamed, roasted, or grilled). Low-sodium or reduced-sodium tomato and vegetable juice. Low-sodium or reduced-sodium tomato sauce and tomato paste. Low-sodium or reduced-sodium canned vegetables.  Grains  Whole-grain or whole-wheat bread. Whole-grain or whole-wheat pasta. Brown rice. Oatmeal. Quinoa.  Bulgur. Whole-grain and low-sodium cereals. Tia bread. Low-fat, low-sodium crackers. Whole-wheat flour tortillas.  Meats and other proteins  Skinless chicken or turkey. Ground chicken or turkey. Pork with fat trimmed off. Fish and seafood. Egg whites. Dried beans, peas, or lentils. Unsalted nuts, nut butters, and seeds. Unsalted canned beans. Lean cuts of beef with fat trimmed off. Low-sodium, lean precooked or cured meat, such as sausages or meat loaves.  Dairy  Low-fat (1%) or fat-free (skim) milk. Reduced-fat, low-fat, or fat-free cheeses. Nonfat, low-sodium ricotta or cottage cheese. Low-fat or nonfat yogurt. Low-fat, low-sodium cheese.  Fats and oils  Soft margarine without trans fats. Vegetable oil. Reduced-fat, low-fat, or light mayonnaise and salad dressings (reduced-sodium). Canola, safflower, olive, avocado, soybean, and sunflower oils. Avocado.  Seasonings and condiments  Herbs. Spices. Seasoning mixes without salt.  Other foods  Unsalted popcorn and pretzels. Fat-free sweets.  The items listed above may not be a complete list of foods and beverages you can eat. Contact a dietitian for more information.  What foods should I avoid?  Fruits  Canned fruit in a light or heavy syrup. Fried fruit. Fruit in cream or butter sauce.  Vegetables  Creamed or fried vegetables. Vegetables in a cheese sauce. Regular canned vegetables (not low-sodium or reduced-sodium). Regular canned tomato sauce and paste (not low-sodium or reduced-sodium). Regular tomato and vegetable juice (not low-sodium or reduced-sodium). Pickles. Olives.  Grains  Baked goods made with fat, such as croissants, muffins, or some breads. Dry pasta or rice meal packs.  Meats and other proteins  Fatty cuts of meat. Ribs. Fried meat. De Jesus. Bologna, salami, and other precooked or cured meats, such as sausages or meat loaves. Fat from the back of a pig (fatback). Bratwurst. Salted nuts and seeds. Canned beans with added salt. Canned or smoked fish.  Whole eggs or egg yolks. Chicken or turkey with skin.  Dairy  Whole or 2% milk, cream, and half-and-half. Whole or full-fat cream cheese. Whole-fat or sweetened yogurt. Full-fat cheese. Nondairy creamers. Whipped toppings. Processed cheese and cheese spreads.  Fats and oils  Butter. Stick margarine. Lard. Shortening. Ghee. De Jesus fat. Tropical oils, such as coconut, palm kernel, or palm oil.  Seasonings and condiments  Onion salt, garlic salt, seasoned salt, table salt, and sea salt. Worcestershire sauce. Tartar sauce. Barbecue sauce. Teriyaki sauce. Soy sauce, including reduced-sodium. Steak sauce. Canned and packaged gravies. Fish sauce. Oyster sauce. Cocktail sauce. Store-bought horseradish. Ketchup. Mustard. Meat flavorings and tenderizers. Bouillon cubes. Hot sauces. Pre-made or packaged marinades. Pre-made or packaged taco seasonings. Relishes. Regular salad dressings.  Other foods  Salted popcorn and pretzels.  The items listed above may not be a complete list of foods and beverages you should avoid. Contact a dietitian for more information.  Where to find more information  National Heart, Lung, and Blood [place]: www.nhlbi.nih.gov  American Heart Association: www.heart.org  Academy of Nutrition and Dietetics: www.eatright.org  National Kidney Foundation: www.kidney.org  Summary  The DASH eating plan is a healthy eating plan that has been shown to reduce high blood pressure (hypertension). It may also reduce your risk for type 2 diabetes, heart disease, and stroke.  When on the DASH eating plan, aim to eat more fresh fruits and vegetables, whole grains, lean proteins, low-fat dairy, and heart-healthy fats.  With the DASH eating plan, you should limit salt (sodium) intake to 2,300 mg a day. If you have hypertension, you may need to reduce your sodium intake to 1,500 mg a day.  Work with your health care provider or dietitian to adjust your eating plan to your individual calorie needs.  This information is not  intended to replace advice given to you by your health care provider. Make sure you discuss any questions you have with your health care provider.  Document Revised: 11/20/2020 Document Reviewed: 11/20/2020  [employer] Patient Education © 2023 [employer]  Fall Prevention in the Home, Adult  Falls can cause injuries and can happen to people of all ages. There are many things you can do to make your home safe and to help prevent falls. Ask for help when making these changes.  What actions can I take to prevent falls?  General Instructions  Use good lighting in all rooms. Replace any light bulbs that burn out.  Turn on the lights in dark areas. Use night-lights.  Keep items that you use often in easy-to-reach places. Lower the shelves around your home if needed.  Set up your furniture so you have a clear path. Avoid moving your furniture around.  Do not have throw rugs or other things on the floor that can make you trip.  Avoid walking on wet floors.  If any of your floors are uneven, fix them.  Add color or contrast paint or tape to clearly chapin and help you see:  Grab bars or handrails.  First and last steps of staircases.  Where the edge of each step is.  If you use a stepladder:  Make sure that it is fully opened. Do not climb a closed stepladder.  Make sure the sides of the stepladder are locked in place.  Ask someone to hold the stepladder while you use it.  Know where your pets are when moving through your home.  What can I do in the bathroom?         Keep the floor dry. Clean up any water on the floor right away.  Remove soap buildup in the tub or shower.  Use nonskid mats or decals on the floor of the tub or shower.  Attach bath mats securely with double-sided, nonslip rug tape.  If you need to sit down in the shower, use a plastic, nonslip stool.  Install grab bars by the toilet and in the tub and shower. Do not use towel bars as grab bars.  What can I do in the bedroom?  Make sure that you have a light by  your bed that is easy to reach.  Do not use any sheets or blankets for your bed that hang to the floor.  Have a firm chair with side arms that you can use for support when you get dressed.  What can I do in the kitchen?  Clean up any spills right away.  If you need to reach something above you, use a step stool with a grab bar.  Keep electrical cords out of the way.  Do not use floor polish or wax that makes floors slippery.  What can I do with my stairs?  Do not leave any items on the stairs.  Make sure that you have a light switch at the top and the bottom of the stairs.  Make sure that there are handrails on both sides of the stairs. Fix handrails that are broken or loose.  Install nonslip stair treads on all your stairs.  Avoid having throw rugs at the top or bottom of the stairs.  Choose a carpet that does not hide the edge of the steps on the stairs.  Check carpeting to make sure that it is firmly attached to the stairs. Fix carpet that is loose or worn.  What can I do on the outside of my home?  Use bright outdoor lighting.  Fix the edges of walkways and driveways and fix any cracks.  Remove anything that might make you trip as you walk through a door, such as a raised step or threshold.  Trim any bushes or trees on paths to your home.  Check to see if handrails are loose or broken and that both sides of all steps have handrails.  Install guardrails along the edges of any raised decks and porches.  Clear paths of anything that can make you trip, such as tools or rocks.  Have leaves, snow, or ice cleared regularly.  Use sand or salt on paths during winter.  Clean up any spills in your garage right away. This includes grease or oil spills.  What other actions can I take?  Wear shoes that:  Have a low heel. Do not wear high heels.  Have rubber bottoms.  Feel good on your feet and fit well.  Are closed at the toe. Do not wear open-toe sandals.  Use tools that help you move around if needed. These  include:  Canes.  Walkers.  Scooters.  Crutches.  Review your medicines with your doctor. Some medicines can make you feel dizzy. This can increase your chance of falling.  Ask your doctor what else you can do to help prevent falls.  Where to find more information  Centers for Disease Control and PreventionWEN: www.cdc.gov  National [place] on Aging: www.daphney.nih.gov  Contact a doctor if:  You are afraid of falling at home.  You feel weak, drowsy, or dizzy at home.  You fall at home.  Summary  There are many simple things that you can do to make your home safe and to help prevent falls.  Ways to make your home safe include removing things that can make you trip and installing grab bars in the bathroom.  Ask for help when making these changes in your home.  This information is not intended to replace advice given to you by your health care provider. Make sure you discuss any questions you have with your health care provider.  Document Revised: 09/19/2022 Document Reviewed: 07/21/2021  [employer] Patient Education © 2023 [employer]

## 2023-11-02 ENCOUNTER — PATIENT ROUNDING (BHMG ONLY) (OUTPATIENT)
Dept: NEUROSURGERY | Facility: CLINIC | Age: 63
End: 2023-11-02
Payer: COMMERCIAL

## 2023-11-02 NOTE — PROGRESS NOTES
A My-Chart message has been sent to the patient for PATIENT ROUNDING with [facility].

## 2023-11-15 ENCOUNTER — TELEPHONE (OUTPATIENT)
Dept: NEUROSURGERY | Facility: CLINIC | Age: 63
End: 2023-11-15

## 2023-11-15 NOTE — TELEPHONE ENCOUNTER
Caller: PATIENT    Relationship: SELF    Best call back number: [phone]    What is the best time to reach you: ANYTIME    Who are you requesting to speak with (clinical staff, provider,  specific staff member): CLINICAL STAFF    Do you know the name of the person who called: NA    What was the call regarding: PT CALLED AND STATES THAT HE WAS TO CALL BACK TO ADVISE VIOLETA WALTER OF HOW THE MEDICATION WAS DOING-PT STATES THAT NEITHER MEDICATION IS HELPING-PT STATES AFTER PHYSICAL THERAPY THE OTHER DAY AND A FEW ERRANDS HE WAS HURTING VERY BADLY-PT STATES IT WENT FROM A 2 TO 12 ON THE PAIN SCALE-PT IS ASKING FOR A CALL BACK TO DISCUSS-SENDING TO OFFICE TO ADVISE THANK YOU     Is it okay if the provider responds through MyChart: NO

## 2023-12-12 ENCOUNTER — OFFICE VISIT (OUTPATIENT)
Dept: NEUROSURGERY | Facility: CLINIC | Age: 63
End: 2023-12-12
Payer: COMMERCIAL

## 2023-12-12 VITALS — WEIGHT: 304.4 LBS | BODY MASS INDEX: 35.22 KG/M2 | HEIGHT: 78 IN

## 2023-12-12 DIAGNOSIS — E66.01 CLASS 2 SEVERE OBESITY DUE TO EXCESS CALORIES WITH SERIOUS COMORBIDITY AND BODY MASS INDEX (BMI) OF 35.0 TO 35.9 IN ADULT: ICD-10-CM

## 2023-12-12 DIAGNOSIS — R20.0 NUMBNESS AND TINGLING OF LEFT LOWER EXTREMITY: ICD-10-CM

## 2023-12-12 DIAGNOSIS — R20.2 NUMBNESS AND TINGLING OF LEFT LOWER EXTREMITY: ICD-10-CM

## 2023-12-12 DIAGNOSIS — M79.605 PAIN IN BOTH LOWER EXTREMITIES: ICD-10-CM

## 2023-12-12 DIAGNOSIS — M79.604 PAIN IN BOTH LOWER EXTREMITIES: ICD-10-CM

## 2023-12-12 DIAGNOSIS — M54.50 LUMBAR BACK PAIN: Primary | ICD-10-CM

## 2023-12-12 PROCEDURE — 99214 OFFICE O/P EST MOD 30 MIN: CPT | Performed by: NURSE PRACTITIONER

## 2023-12-12 NOTE — PROGRESS NOTES
Chief complaint:   Chief Complaint   Patient presents with    Back Pain     Pt is here for followup on back and leg pain.       Subjective     HPI:   Last encounter: 10/31/2023    Interval History: Hermilo Mccord is a 62 y.o.  male who presents today for continued evaluation of lumbar back and bilateral leg pain, 50% back, 50% legs.    Mr. Mccord has done fairly well since we last saw him.  He is recently completed 8/15 sessions of physical therapy with an overall worsening in his symptoms.    His back pain is currently constant, waxing and waning in severity.  He additionally reports intermittent radicular pain, numbness, and tingling to the posterior lateral right thigh that extends down the anterior leg to the ankle, as well as intermittent radicular pain to the posterior left thigh that does not extend past the knee.  His overall discomfort worsens with prolonged sitting, standing, walking, and physical activity that requires twisting.  Mr. Mccord states he can ambulate approximately 25 feet before having to sit and rest due to his worsening pain.  Alleviating factors include lying down, repositioning, Norco, and meloxicam.  He trialed Flexeril and gabapentin, however discontinued due to lack of efficacy.  He denies lower extremity weakness, need for assist while ambulating, or falls.  He additionally denies fevers, chills, night sweats, unexplained weight loss, saddle anesthesia, or bowel dysfunction.  He does endorse intermittent urgency to void but denies incontinence.  He currently rates the severity of his symptoms 9/10.  No additional concerns at this time.    Oswestry Disability Index = 50%   Score   Pain Intensity Moderate pain-2   Personal Care Look after myslef but very painful-1   Lifting Only very light weights-4   Walking Pain prevents > 100 yards-3   Sitting Pain prevents sitting > 1 hr-2   Standing Pain limits standing to < 10 min-4   Sleeping Can only sleep < 4 hrs-3   Sex  Life (if applicable) Sex causes extra pain-2   Social Life I do not go out as often because of pain-3   Traveling Pain is bad but trips over 2 hrs-2   ([place] et al, 1980)    SCORE INTERPRETATION OF THE OSWESTRY LBP DISABILITY QUESTIONNAIRE     40-60% Severe disability Pain remains the main problem in this group of patients, but travel, personal care, social life, sexual activity, and sleep are also affected.  These patients require detailed investigation.     ROS  Review of Systems   Constitutional: Negative.    HENT: Negative.     Eyes: Negative.    Respiratory: Negative.     Cardiovascular: Negative.    Gastrointestinal: Negative.    Endocrine: Negative.    Genitourinary: Negative.    Musculoskeletal:  Positive for back pain and gait problem.   Skin: Negative.    Allergic/Immunologic: Negative.    Neurological:  Positive for numbness.   Hematological: Negative.    Psychiatric/Behavioral: Negative.     All other systems reviewed and are negative.    PFSH:  Past Medical History:   Diagnosis Date    Diabetes mellitus     GERD (gastroesophageal reflux disease)     Hyperlipidemia     Hypertension     Kidney stone     Low back pain     Numbness     left-sided intermittently      Past Surgical History:   Procedure Laterality Date    APPENDECTOMY      CARPAL TUNNEL RELEASE Bilateral     CYSTOSCOPY, RETROGRADE PYELOGRAM AND STENT INSERTION Left 6/24/2021    Procedure: CYSTOSCOPY RETROGRADE PYELOGRAM AND STENT INSERTION;  Surgeon: Jamil Santiago MD;  Location:  [facility];  Service: Urology;  Laterality: Left;    ELBOW PROCEDURE Right     KNEE SURGERY Bilateral     SINUS SURGERY      URETEROSCOPY LASER LITHOTRIPSY WITH STENT INSERTION Left 7/9/2021    Procedure: URETEROSCOPY LASER LITHOTRIPSY WITH STENT INSERTION;  Surgeon: Jamil Santiago MD;  Location:  [facility];  Service: Urology;  Laterality: Left;     Objective      Current Outpatient Medications   Medication Sig Dispense Refill    aspirin 81 MG chewable  "tablet Chew 1 tablet Daily.      cyclobenzaprine (FLEXERIL) 10 MG tablet Take 1 tablet by mouth At Night As Needed for Muscle Spasms. 30 tablet 0    desloratadine (CLARINEX) 5 MG tablet Take 1 tablet by mouth Daily.      Entresto 49-51 MG tablet Take 1 tablet by mouth 2 (Two) Times a Day.      esomeprazole (nexIUM) 40 MG capsule Take 1 capsule by mouth Every Morning Before Breakfast.      glimepiride (AMARYL) 2 MG tablet Take 1 tablet by mouth Every Morning Before Breakfast.      hydroCHLOROthiazide (HYDRODIURIL) 12.5 MG tablet       hydroCHLOROthiazide (HYDRODIURIL) 25 MG tablet Take 1 tablet by mouth Daily.      HYDROcodone-acetaminophen (NORCO)  MG per tablet       Jardiance 25 MG tablet tablet Take 1 tablet by mouth Daily.      meloxicam (MOBIC) 15 MG tablet TAKE 1 TABLET BY MOUTH EVERY DAY WITH A MEAL      simvastatin (ZOCOR) 40 MG tablet Take 1 tablet by mouth Every Night.      simvastatin (ZOCOR) 40 MG tablet Take 1 tablet by mouth every night at bedtime.      gabapentin (NEURONTIN) 100 MG capsule Take 1 capsule by mouth 3 (Three) Times a Day for 3 days, THEN 3 capsules 3 (Three) Times a Day for 11 days. 108 capsule 0     No current facility-administered medications for this visit.     Vital Signs  Ht 198.1 cm (78\")   Wt (!) 138 kg (304 lb 6.4 oz)   BMI 35.18 kg/m²   Physical Exam  Vitals and nursing note reviewed.   Constitutional:       General: He is not in acute distress.     Appearance: Normal appearance. He is well-developed and well-groomed. He is obese. He is not ill-appearing, toxic-appearing or diaphoretic.      Comments: BMI 35.18   HENT:      Head: Normocephalic and atraumatic.      Right Ear: Hearing normal.      Left Ear: Hearing normal.   Eyes:      Extraocular Movements: EOM normal.      Conjunctiva/sclera: Conjunctivae normal.      Pupils: Pupils are equal, round, and reactive to light.   Neck:      Trachea: Trachea normal.   Cardiovascular:      Rate and Rhythm: Normal rate and " regular rhythm.   Pulmonary:      Effort: Pulmonary effort is normal. No tachypnea, bradypnea, accessory muscle usage or respiratory distress.   Abdominal:      Palpations: Abdomen is soft.   Musculoskeletal:      Cervical back: Full passive range of motion without pain and neck supple.   Skin:     General: Skin is warm and dry.   Neurological:      Mental Status: He is alert and oriented to person, place, and time.      GCS: GCS eye subscore is 4. GCS verbal subscore is 5. GCS motor subscore is 6.      Deep Tendon Reflexes:      Reflex Scores:       Tricep reflexes are 2+ on the right side and 2+ on the left side.       Bicep reflexes are 2+ on the right side and 2+ on the left side.       Brachioradialis reflexes are 2+ on the right side and 2+ on the left side.       Patellar reflexes are 1+ on the right side and 1+ on the left side.       Achilles reflexes are 0 on the right side and 0 on the left side.  Psychiatric:         Speech: Speech normal.         Behavior: Behavior normal. Behavior is cooperative.       Neurologic Exam     Mental Status   Oriented to person, place, and time.   Attention: normal. Concentration: normal.   Speech: speech is normal   Level of consciousness: alert    Cranial Nerves     CN II   Visual fields full to confrontation.     CN III, IV, VI   Pupils are equal, round, and reactive to light.  Extraocular motions are normal.     CN V   Facial sensation intact.     CN VII   Facial expression full, symmetric.     CN VIII   CN VIII normal.     CN IX, X   CN IX normal.     CN XI   CN XI normal.     Motor Exam   Right arm tone: normal  Left arm tone: normal  Right leg tone: normal  Left leg tone: normal    Strength   Right deltoid: 5/5  Left deltoid: 5/5  Right biceps: 5/5  Left biceps: 5/5  Right triceps: 5/5  Left triceps: 5/5  Right wrist extension: 5/5  Left wrist extension: 5/5  Right iliopsoas: 5/5  Left iliopsoas: 5/5  Right quadriceps: 5/5  Left quadriceps: 5/5  Right anterior  tibial: 5/5  Left anterior tibial: 5/5  Right gastroc: 5/5  Left gastroc: 5/5  Right EHL 5/5  Left EHL 5/5       Sensory Exam   Right arm light touch: normal  Left arm light touch: normal  Sensory deficit distribution on right: S1  Sensory deficit distribution on left: S1    Gait, Coordination, and Reflexes     Tremor   Resting tremor: absent  Intention tremor: absent  Action tremor: absent    Reflexes   Right brachioradialis: 2+  Left brachioradialis: 2+  Right biceps: 2+  Left biceps: 2+  Right triceps: 2+  Left triceps: 2+  Right patellar: 1+  Left patellar: 1+  Right achilles: 0  Left achilles: 0  Right plantar: normal  Left plantar: normal  Right Elder: absent  Left Elder: absent  Right ankle clonus: absent  Left ankle clonus: absent  Right pendular knee jerk: absent  Left pendular knee jerk: absent  Antalgic gait   (12 bullet pts)    Results Review:   8/8/2023      10/31/2023      Assessment/Plan:   Hermilo Mccord is a 62 y.o. male with a significant medical history of hypertension, hyperlipidemia, GERD, diabetes, and obesity.  He presents with a known problem of lumbar back and bilateral leg pain in the S1 distribution, right > left, 50% legs, 50% back. CHOCO: 36,50.  Physical exam findings of lower extremity hyporeflexia with absent bilateral Achilles reflexes and a short stepping antalgic gait.  His imaging shows no evidence of acute fractures, anteriolisthesis of L4 over L5 that appears relatively unchanged in the upright versus supine position, and moderate multilevel degenerative disc disease.     Recommendations:  Lumbar back pain  Bilateral leg pain in the S1 distribution, right > left  Differential diagnosis include, but are not limited to degenerative facet arthropathy, lumbar stenosis with neurogenic claudication, Lumbar stenosis with bilateral lower extremity radiculopathy, or herniated lumbar intervertebral disc.      Mr. Mccord presents today for continued evaluation of lumbar back pain  and bilateral leg pain and dysesthesias in the S1 distribution, right > left.  He has recently completed 8/15 sessions of physical therapy with an overall worsening in his symptoms.  For further evaluation I find it prudent to proceed today by obtaining a noncontrasted MRI of the lumbar spine to rule out lumbar stenosis and need for surgical intervention.  We will also send him for a EMG and nerve conduction study of the bilateral lower extremities to confirm or refute radiculopathy from the lumbar spine and/or a peripheral neuropathy as a causative factor for his lower extremity dysesthesias.  I would like him to return for reassessment with Dr. Guerrero after all studies been completed to discuss need for surgical intervention.  Mr. Mccord knows he may contact the neurosurgical clinic to return sooner for any new or additional concerns and agrees with this plan of care.    Class 2 obesity (BMI 35.0-39.9) due to excessive calories with serious comorbidities BMI of 35.0-35.9 in adult  Body mass index is 35.18 kg/m². Information on the DASH diet provided in the AVS.  We will continue to provided diet and exercise information with the goal of weight loss at each scheduled appointment.     Diagnoses and all orders for this visit:    1. Lumbar back pain (Primary)  -     MRI Lumbar Spine Without Contrast; Future    2. Pain in both lower extremities  -     EMG & Nerve Conduction Test; Future  -     MRI Lumbar Spine Without Contrast; Future    3. Numbness and tingling of left lower extremity  -     EMG & Nerve Conduction Test; Future  -     MRI Lumbar Spine Without Contrast; Future    4. Class 2 severe obesity due to excess calories with serious comorbidity and body mass index (BMI) of 35.0 to 35.9 in adult      Return for FOLLOW UP WITH DR. GUERRERO NEXT AVAILABLE WITH MRI AND EMG.    Thank you, for allowing me to continue to participate in the care of this patient.    Sincerely,  CAROLA Ramos

## 2023-12-12 NOTE — PATIENT INSTRUCTIONS
"DASH Eating Plan  DASH stands for Dietary Approaches to Stop Hypertension. The DASH eating plan is a healthy eating plan that has been shown to:  Reduce high blood pressure (hypertension).  Reduce your risk for type 2 diabetes, heart disease, and stroke.  Help with weight loss.  What are tips for following this plan?  Reading food labels  Check food labels for the amount of salt (sodium) per serving. Choose foods with less than 5 percent of the Daily Value of sodium. Generally, foods with less than 300 milligrams (mg) of sodium per serving fit into this eating plan.  To find whole grains, look for the word \"whole\" as the first word in the ingredient list.  Shopping  Buy products labeled as \"low-sodium\" or \"no salt added.\"  Buy fresh foods. Avoid canned foods and pre-made or frozen meals.  Cooking  Avoid adding salt when cooking. Use salt-free seasonings or herbs instead of table salt or sea salt. Check with your health care provider or pharmacist before using salt substitutes.  Do not wright foods. Cook foods using healthy methods such as baking, boiling, grilling, roasting, and broiling instead.  Cook with heart-healthy oils, such as olive, canola, avocado, soybean, or sunflower oil.  Meal planning    Eat a balanced diet that includes:  4 or more servings of fruits and 4 or more servings of vegetables each day. Try to fill one-half of your plate with fruits and vegetables.  6-8 servings of whole grains each day.  Less than 6 oz (170 g) of lean meat, poultry, or fish each day. A 3-oz (85-g) serving of meat is about the same size as a deck of cards. One egg equals 1 oz (28 g).  2-3 servings of low-fat dairy each day. One serving is 1 cup (237 mL).  1 serving of nuts, seeds, or beans 5 times each week.  2-3 servings of heart-healthy fats. Healthy fats called omega-3 fatty acids are found in foods such as walnuts, flaxseeds, fortified milks, and eggs. These fats are also found in cold-water fish, such as sardines, salmon, " and mackerel.  Limit how much you eat of:  Canned or prepackaged foods.  Food that is high in trans fat, such as some fried foods.  Food that is high in saturated fat, such as fatty meat.  Desserts and other sweets, sugary drinks, and other foods with added sugar.  Full-fat dairy products.  Do not salt foods before eating.  Do not eat more than 4 egg yolks a week.  Try to eat at least 2 vegetarian meals a week.  Eat more home-cooked food and less restaurant, buffet, and fast food.  Lifestyle  When eating at a restaurant, ask that your food be prepared with less salt or no salt, if possible.  If you drink alcohol:  Limit how much you use to:  0-1 drink a day for women who are not pregnant.  0-2 drinks a day for men.  Be aware of how much alcohol is in your drink. In the [place] one drink equals one 12 oz bottle of beer (355 mL), one 5 oz glass of wine (148 mL), or one 1½ oz glass of hard liquor (44 mL).  General information  Avoid eating more than 2,300 mg of salt a day. If you have hypertension, you may need to reduce your sodium intake to 1,500 mg a day.  Work with your health care provider to maintain a healthy body weight or to lose weight. Ask what an ideal weight is for you.  Get at least 30 minutes of exercise that causes your heart to beat faster (aerobic exercise) most days of the week. Activities may include walking, swimming, or biking.  Work with your health care provider or dietitian to adjust your eating plan to your individual calorie needs.  What foods should I eat?  Fruits  All fresh, dried, or frozen fruit. Canned fruit in natural juice (without added sugar).  Vegetables  Fresh or frozen vegetables (raw, steamed, roasted, or grilled). Low-sodium or reduced-sodium tomato and vegetable juice. Low-sodium or reduced-sodium tomato sauce and tomato paste. Low-sodium or reduced-sodium canned vegetables.  Grains  Whole-grain or whole-wheat bread. Whole-grain or whole-wheat pasta. Brown rice. Oatmeal. Quinoa.  Bulgur. Whole-grain and low-sodium cereals. Tia bread. Low-fat, low-sodium crackers. Whole-wheat flour tortillas.  Meats and other proteins  Skinless chicken or turkey. Ground chicken or turkey. Pork with fat trimmed off. Fish and seafood. Egg whites. Dried beans, peas, or lentils. Unsalted nuts, nut butters, and seeds. Unsalted canned beans. Lean cuts of beef with fat trimmed off. Low-sodium, lean precooked or cured meat, such as sausages or meat loaves.  Dairy  Low-fat (1%) or fat-free (skim) milk. Reduced-fat, low-fat, or fat-free cheeses. Nonfat, low-sodium ricotta or cottage cheese. Low-fat or nonfat yogurt. Low-fat, low-sodium cheese.  Fats and oils  Soft margarine without trans fats. Vegetable oil. Reduced-fat, low-fat, or light mayonnaise and salad dressings (reduced-sodium). Canola, safflower, olive, avocado, soybean, and sunflower oils. Avocado.  Seasonings and condiments  Herbs. Spices. Seasoning mixes without salt.  Other foods  Unsalted popcorn and pretzels. Fat-free sweets.  The items listed above may not be a complete list of foods and beverages you can eat. Contact a dietitian for more information.  What foods should I avoid?  Fruits  Canned fruit in a light or heavy syrup. Fried fruit. Fruit in cream or butter sauce.  Vegetables  Creamed or fried vegetables. Vegetables in a cheese sauce. Regular canned vegetables (not low-sodium or reduced-sodium). Regular canned tomato sauce and paste (not low-sodium or reduced-sodium). Regular tomato and vegetable juice (not low-sodium or reduced-sodium). Pickles. Olives.  Grains  Baked goods made with fat, such as croissants, muffins, or some breads. Dry pasta or rice meal packs.  Meats and other proteins  Fatty cuts of meat. Ribs. Fried meat. De Jesus. Bologna, salami, and other precooked or cured meats, such as sausages or meat loaves. Fat from the back of a pig (fatback). Bratwurst. Salted nuts and seeds. Canned beans with added salt. Canned or smoked fish.  Whole eggs or egg yolks. Chicken or turkey with skin.  Dairy  Whole or 2% milk, cream, and half-and-half. Whole or full-fat cream cheese. Whole-fat or sweetened yogurt. Full-fat cheese. Nondairy creamers. Whipped toppings. Processed cheese and cheese spreads.  Fats and oils  Butter. Stick margarine. Lard. Shortening. Ghee. De Jesus fat. Tropical oils, such as coconut, palm kernel, or palm oil.  Seasonings and condiments  Onion salt, garlic salt, seasoned salt, table salt, and sea salt. Worcestershire sauce. Tartar sauce. Barbecue sauce. Teriyaki sauce. Soy sauce, including reduced-sodium. Steak sauce. Canned and packaged gravies. Fish sauce. Oyster sauce. Cocktail sauce. Store-bought horseradish. Ketchup. Mustard. Meat flavorings and tenderizers. Bouillon cubes. Hot sauces. Pre-made or packaged marinades. Pre-made or packaged taco seasonings. Relishes. Regular salad dressings.  Other foods  Salted popcorn and pretzels.  The items listed above may not be a complete list of foods and beverages you should avoid. Contact a dietitian for more information.  Where to find more information  National Heart, Lung, and Blood [place]: www.nhlbi.nih.gov  American Heart Association: www.heart.org  Academy of Nutrition and Dietetics: www.eatright.org  National Kidney Foundation: www.kidney.org  Summary  The DASH eating plan is a healthy eating plan that has been shown to reduce high blood pressure (hypertension). It may also reduce your risk for type 2 diabetes, heart disease, and stroke.  When on the DASH eating plan, aim to eat more fresh fruits and vegetables, whole grains, lean proteins, low-fat dairy, and heart-healthy fats.  With the DASH eating plan, you should limit salt (sodium) intake to 2,300 mg a day. If you have hypertension, you may need to reduce your sodium intake to 1,500 mg a day.  Work with your health care provider or dietitian to adjust your eating plan to your individual calorie needs.  This information is not  intended to replace advice given to you by your health care provider. Make sure you discuss any questions you have with your health care provider.  Document Revised: 11/20/2020 Document Reviewed: 11/20/2020  Elsevier Patient Education © 2023 [employer]

## 2024-01-03 ENCOUNTER — HOSPITAL ENCOUNTER (OUTPATIENT)
Dept: MRI IMAGING | Facility: HOSPITAL | Age: 64
Discharge: HOME | End: 2024-01-03
Admitting: NURSE PRACTITIONER
Payer: COMMERCIAL

## 2024-01-03 ENCOUNTER — TELEPHONE (OUTPATIENT)
Dept: NEUROSURGERY | Facility: CLINIC | Age: 64
End: 2024-01-03
Payer: COMMERCIAL

## 2024-01-03 DIAGNOSIS — R20.0 NUMBNESS AND TINGLING OF LEFT LOWER EXTREMITY: ICD-10-CM

## 2024-01-03 DIAGNOSIS — M79.605 PAIN IN BOTH LOWER EXTREMITIES: ICD-10-CM

## 2024-01-03 DIAGNOSIS — M54.50 LUMBAR BACK PAIN: ICD-10-CM

## 2024-01-03 DIAGNOSIS — M79.604 PAIN IN BOTH LOWER EXTREMITIES: ICD-10-CM

## 2024-01-03 DIAGNOSIS — R20.2 NUMBNESS AND TINGLING OF LEFT LOWER EXTREMITY: ICD-10-CM

## 2024-01-03 PROCEDURE — 72148 MRI LUMBAR SPINE W/O DYE: CPT

## 2024-01-03 NOTE — TELEPHONE ENCOUNTER
Pt called and states that his pain is worse and he having issues urination leaking issues and complete numbness below the knees.   MRI  moved up for today at 8:30.

## 2024-01-05 ENCOUNTER — OFFICE VISIT (OUTPATIENT)
Dept: NEUROSURGERY | Facility: CLINIC | Age: 64
End: 2024-01-05
Payer: COMMERCIAL

## 2024-01-05 VITALS — HEIGHT: 78 IN | WEIGHT: 300 LBS | BODY MASS INDEX: 34.71 KG/M2

## 2024-01-05 DIAGNOSIS — M79.605 PAIN IN BOTH LOWER EXTREMITIES: ICD-10-CM

## 2024-01-05 DIAGNOSIS — M54.50 LUMBAR BACK PAIN: ICD-10-CM

## 2024-01-05 DIAGNOSIS — Z78.9 NON-SMOKER: ICD-10-CM

## 2024-01-05 DIAGNOSIS — E66.09 CLASS 1 OBESITY DUE TO EXCESS CALORIES WITH SERIOUS COMORBIDITY AND BODY MASS INDEX (BMI) OF 34.0 TO 34.9 IN ADULT: ICD-10-CM

## 2024-01-05 DIAGNOSIS — R20.0 NUMBNESS AND TINGLING OF LEFT LOWER EXTREMITY: ICD-10-CM

## 2024-01-05 DIAGNOSIS — M79.604 PAIN IN BOTH LOWER EXTREMITIES: ICD-10-CM

## 2024-01-05 DIAGNOSIS — G95.9 CERVICAL MYELOPATHY: ICD-10-CM

## 2024-01-05 DIAGNOSIS — R20.2 NUMBNESS AND TINGLING OF LEFT LOWER EXTREMITY: ICD-10-CM

## 2024-01-05 DIAGNOSIS — M48.062 SPINAL STENOSIS, LUMBAR REGION, WITH NEUROGENIC CLAUDICATION: Primary | ICD-10-CM

## 2024-01-05 RX ORDER — CHLORHEXIDINE GLUCONATE ORAL RINSE 1.2 MG/ML
15 SOLUTION DENTAL EVERY 12 HOURS SCHEDULED
OUTPATIENT
Start: 2024-01-05

## 2024-01-05 RX ORDER — FUROSEMIDE 40 MG/1
1 TABLET ORAL DAILY
COMMUNITY
Start: 2023-12-07

## 2024-01-05 RX ORDER — LABETALOL 100 MG/1
2 TABLET, FILM COATED ORAL EVERY 12 HOURS SCHEDULED
COMMUNITY
Start: 2023-12-10

## 2024-01-05 NOTE — PATIENT INSTRUCTIONS
Lumbar Stenosis                Advance Care Planning and Advance Directives     You make decisions on a daily basis - decisions about where you want to live, your career, your home, your life. Perhaps one of the most important decisions you face is your choice for future medical care. Take time to talk with your family and your healthcare team and start planning today.  Advance Care Planning is a process that can help you:  Understand possible future healthcare decisions in light of your own experiences  Reflect on those decision in light of your goals and values  Discuss your decisions with those closest to you and the healthcare professionals that care for you  Make a plan by creating a document that reflects your wishes    Surrogate Decision Maker  In the event of a medical emergency, which has left you unable to communicate or to make your own decisions, you would need someone to make decisions for you.  It is important to discuss your preferences for medical treatment with this person while you are in good health.     Qualities of a surrogate decision maker:  Willing to take on this role and responsibility  Knows what you want for future medical care  Willing to follow your wishes even if they don't agree with them  Able to make difficult medical decisions under stressful circumstances    Advance Directives  These are legal documents you can create that will guide your healthcare team and decision maker(s) when needed. These documents can be stored in the electronic medical record.    Living Will - a legal document to guide your care if you have a terminal condition or a serious illness and are unable to communicate. States vary by statute in document names/types, but most forms may include one or more of the following:        -  Directions regarding life-prolonging treatments        -  Directions regarding artificially provided nutrition/hydration        -  Choosing a healthcare decision maker        -   Direction regarding organ/tissue donation    Durable Power of  for Healthcare - this document names an -in-fact to make medical decisions for you, but it may also allow this person to make personal and financial decisions for you. Please seek the advice of an  if you need this type of document.    **Advance Directives are not required and no one may discriminate against you if you do not sign one.    Medical Orders  Many states allow specific forms/orders signed by your physician to record your wishes for medical treatment in your current state of health. This form, signed in personal communication with your physician, addresses resuscitation and other medical interventions that you may or may not want.      For more information or to schedule a time with a [facility] Advance Care Planning Facilitator contact: [facility] or call [phone] and someone will contact you directly.BMI for Adults  What is BMI?  Body mass index (BMI) is a number that is calculated from a person's weight and height. BMI can help estimate how much of a person's weight is composed of fat. BMI does not measure body fat directly. Rather, it is an alternative to procedures that directly measure body fat, which can be difficult and expensive.  BMI can help identify people who may be at higher risk for certain medical problems.  What are BMI measurements used for?  BMI is used as a screening tool to identify possible weight problems. It helps determine whether a person is obese, overweight, a healthy weight, or underweight.  BMI is useful for:  Identifying a weight problem that may be related to a medical condition or may increase the risk for medical problems.  Promoting changes, such as changes in diet and exercise, to help reach a healthy weight. BMI screening can be repeated to see if these changes are working.  How is BMI calculated?  BMI involves measuring your weight in relation to your height. Both  "height and weight are measured, and the BMI is calculated from those numbers. This can be done either in English ([place] or metric measurements. Note that charts and online BMI calculators are available to help you find your BMI quickly and easily without having to do these calculations yourself.  To calculate your BMI in English ([place] measurements:    Measure your weight in pounds (lb).  Multiply the number of pounds by 703.  For example, for a person who weighs 180 lb, multiply that number by 703, which equals 126,540.  Measure your height in inches. Then multiply that number by itself to get a measurement called \"inches squared.\"  For example, for a person who is 70 inches tall, the \"inches squared\" measurement is 70 inches x 70 inches, which equals 4,900 inches squared.  Divide the total from step 2 (number of lb x 703) by the total from step 3 (inches squared): 126,540 ÷ 4,900 = 25.8. This is your BMI.    To calculate your BMI in metric measurements:  Measure your weight in kilograms (kg).  Measure your height in meters (m). Then multiply that number by itself to get a measurement called \"meters squared.\"  For example, for a person who is 1.75 m tall, the \"meters squared\" measurement is 1.75 m x 1.75 m, which is equal to 3.1 meters squared.  Divide the number of kilograms (your weight) by the meters squared number. In this example: 70 ÷ 3.1 = 22.6. This is your BMI.  What do the results mean?  BMI charts are used to identify whether you are underweight, normal weight, overweight, or obese. The following guidelines will be used:  Underweight: BMI less than 18.5.  Normal weight: BMI between 18.5 and 24.9.  Overweight: BMI between 25 and 29.9.  Obese: BMI of 30 or above.  Keep these notes in mind:  Weight includes both fat and muscle, so someone with a muscular build, such as an athlete, may have a BMI that is higher than 24.9. In cases like these, BMI is not an accurate measure of body fat.  To determine if " "excess body fat is the cause of a BMI of 25 or higher, further assessments may need to be done by a health care provider.  BMI is usually interpreted in the same way for men and women.  Where to find more information  For more information about BMI, including tools to quickly calculate your BMI, go to these websites:  Centers for Disease Control and Prevention: www.cdc.gov  American Heart Association: www.heart.org  National Heart, Lung, and Blood [place]: www.nhlbi.nih.gov  Summary  Body mass index (BMI) is a number that is calculated from a person's weight and height.  BMI may help estimate how much of a person's weight is composed of fat. BMI can help identify those who may be at higher risk for certain medical problems.  BMI can be measured using English measurements or metric measurements.  BMI charts are used to identify whether you are underweight, normal weight, overweight, or obese.  This information is not intended to replace advice given to you by your health care provider. Make sure you discuss any questions you have with your health care provider.  Document Revised: 09/09/2020 Document Reviewed: 07/17/2020  [employer] Patient Education © 2021 [employer] https://www.nhlbi.nih.gov/files/docs/public/heart/dash_brief.pdf\">   DASH Eating Plan  DASH stands for Dietary Approaches to Stop Hypertension. The DASH eating plan is a healthy eating plan that has been shown to:  Reduce high blood pressure (hypertension).  Reduce your risk for type 2 diabetes, heart disease, and stroke.  Help with weight loss.  What are tips for following this plan?  Reading food labels  Check food labels for the amount of salt (sodium) per serving. Choose foods with less than 5 percent of the Daily Value of sodium. Generally, foods with less than 300 milligrams (mg) of sodium per serving fit into this eating plan.  To find whole grains, look for the word \"whole\" as the first word in the ingredient list.  Shopping  Buy products " "labeled as \"low-sodium\" or \"no salt added.\"  Buy fresh foods. Avoid canned foods and pre-made or frozen meals.  Cooking  Avoid adding salt when cooking. Use salt-free seasonings or herbs instead of table salt or sea salt. Check with your health care provider or pharmacist before using salt substitutes.  Do not wright foods. Cook foods using healthy methods such as baking, boiling, grilling, roasting, and broiling instead.  Cook with heart-healthy oils, such as olive, canola, avocado, soybean, or sunflower oil.  Meal planning    Eat a balanced diet that includes:  4 or more servings of fruits and 4 or more servings of vegetables each day. Try to fill one-half of your plate with fruits and vegetables.  6-8 servings of whole grains each day.  Less than 6 oz (170 g) of lean meat, poultry, or fish each day. A 3-oz (85-g) serving of meat is about the same size as a deck of cards. One egg equals 1 oz (28 g).  2-3 servings of low-fat dairy each day. One serving is 1 cup (237 mL).  1 serving of nuts, seeds, or beans 5 times each week.  2-3 servings of heart-healthy fats. Healthy fats called omega-3 fatty acids are found in foods such as walnuts, flaxseeds, fortified milks, and eggs. These fats are also found in cold-water fish, such as sardines, salmon, and mackerel.  Limit how much you eat of:  Canned or prepackaged foods.  Food that is high in trans fat, such as some fried foods.  Food that is high in saturated fat, such as fatty meat.  Desserts and other sweets, sugary drinks, and other foods with added sugar.  Full-fat dairy products.  Do not salt foods before eating.  Do not eat more than 4 egg yolks a week.  Try to eat at least 2 vegetarian meals a week.  Eat more home-cooked food and less restaurant, buffet, and fast food.    Lifestyle  When eating at a restaurant, ask that your food be prepared with less salt or no salt, if possible.  If you drink alcohol:  Limit how much you use to:  0-1 drink a day for women who are " not pregnant.  0-2 drinks a day for men.  Be aware of how much alcohol is in your drink. In the [place] one drink equals one 12 oz bottle of beer (355 mL), one 5 oz glass of wine (148 mL), or one 1½ oz glass of hard liquor (44 mL).  General information  Avoid eating more than 2,300 mg of salt a day. If you have hypertension, you may need to reduce your sodium intake to 1,500 mg a day.  Work with your health care provider to maintain a healthy body weight or to lose weight. Ask what an ideal weight is for you.  Get at least 30 minutes of exercise that causes your heart to beat faster (aerobic exercise) most days of the week. Activities may include walking, swimming, or biking.  Work with your health care provider or dietitian to adjust your eating plan to your individual calorie needs.  What foods should I eat?  Fruits  All fresh, dried, or frozen fruit. Canned fruit in natural juice (without added sugar).  Vegetables  Fresh or frozen vegetables (raw, steamed, roasted, or grilled). Low-sodium or reduced-sodium tomato and vegetable juice. Low-sodium or reduced-sodium tomato sauce and tomato paste. Low-sodium or reduced-sodium canned vegetables.  Grains  Whole-grain or whole-wheat bread. Whole-grain or whole-wheat pasta. Brown rice. Oatmeal. Quinoa. Bulgur. Whole-grain and low-sodium cereals. Tia bread. Low-fat, low-sodium crackers. Whole-wheat flour tortillas.  Meats and other proteins  Skinless chicken or turkey. Ground chicken or turkey. Pork with fat trimmed off. Fish and seafood. Egg whites. Dried beans, peas, or lentils. Unsalted nuts, nut butters, and seeds. Unsalted canned beans. Lean cuts of beef with fat trimmed off. Low-sodium, lean precooked or cured meat, such as sausages or meat loaves.  Dairy  Low-fat (1%) or fat-free (skim) milk. Reduced-fat, low-fat, or fat-free cheeses. Nonfat, low-sodium ricotta or cottage cheese. Low-fat or nonfat yogurt. Low-fat, low-sodium cheese.  Fats and oils  Soft margarine  without trans fats. Vegetable oil. Reduced-fat, low-fat, or light mayonnaise and salad dressings (reduced-sodium). Canola, safflower, olive, avocado, soybean, and sunflower oils. Avocado.  Seasonings and condiments  Herbs. Spices. Seasoning mixes without salt.  Other foods  Unsalted popcorn and pretzels. Fat-free sweets.  The items listed above may not be a complete list of foods and beverages you can eat. Contact a dietitian for more information.  What foods should I avoid?  Fruits  Canned fruit in a light or heavy syrup. Fried fruit. Fruit in cream or butter sauce.  Vegetables  Creamed or fried vegetables. Vegetables in a cheese sauce. Regular canned vegetables (not low-sodium or reduced-sodium). Regular canned tomato sauce and paste (not low-sodium or reduced-sodium). Regular tomato and vegetable juice (not low-sodium or reduced-sodium). Pickles. Olives.  Grains  Baked goods made with fat, such as croissants, muffins, or some breads. Dry pasta or rice meal packs.  Meats and other proteins  Fatty cuts of meat. Ribs. Fried meat. De Jesus. Bologna, salami, and other precooked or cured meats, such as sausages or meat loaves. Fat from the back of a pig (fatback). Bratwurst. Salted nuts and seeds. Canned beans with added salt. Canned or smoked fish. Whole eggs or egg yolks. Chicken or turkey with skin.  Dairy  Whole or 2% milk, cream, and half-and-half. Whole or full-fat cream cheese. Whole-fat or sweetened yogurt. Full-fat cheese. Nondairy creamers. Whipped toppings. Processed cheese and cheese spreads.  Fats and oils  Butter. Stick margarine. Lard. Shortening. Ghee. De Jesus fat. Tropical oils, such as coconut, palm kernel, or palm oil.  Seasonings and condiments  Onion salt, garlic salt, seasoned salt, table salt, and sea salt. Worcestershire sauce. Tartar sauce. Barbecue sauce. Teriyaki sauce. Soy sauce, including reduced-sodium. Steak sauce. Canned and packaged gravies. Fish sauce. Oyster sauce. Cocktail sauce.  Store-bought horseradish. Ketchup. Mustard. Meat flavorings and tenderizers. Bouillon cubes. Hot sauces. Pre-made or packaged marinades. Pre-made or packaged taco seasonings. Relishes. Regular salad dressings.  Other foods  Salted popcorn and pretzels.  The items listed above may not be a complete list of foods and beverages you should avoid. Contact a dietitian for more information.  Where to find more information  National Heart, Lung, and Blood [place]: www.nhlbi.nih.gov  American Heart Association: www.heart.org  Academy of Nutrition and Dietetics: www.eatright.org  National Kidney Foundation: www.kidney.org  Summary  The DASH eating plan is a healthy eating plan that has been shown to reduce high blood pressure (hypertension). It may also reduce your risk for type 2 diabetes, heart disease, and stroke.  When on the DASH eating plan, aim to eat more fresh fruits and vegetables, whole grains, lean proteins, low-fat dairy, and heart-healthy fats.  With the DASH eating plan, you should limit salt (sodium) intake to 2,300 mg a day. If you have hypertension, you may need to reduce your sodium intake to 1,500 mg a day.  Work with your health care provider or dietitian to adjust your eating plan to your individual calorie needs.  This information is not intended to replace advice given to you by your health care provider. Make sure you discuss any questions you have with your health care provider.  Document Revised: 11/20/2020 Document Reviewed: 11/20/2020  [employer] Patient Education © 2021 [employer] High-Protein and High-Calorie Diet  Eating high-protein and high-calorie foods can help you to gain weight, heal after an injury, and recover after an illness or surgery. The specific amount of daily protein and calories you need depends on:  Your body weight.  The reason this diet is recommended for you.  What is my plan?  Generally, a high-protein, high-calorie diet involves:  Eating 250-500 extra calories each  day.  Making sure that you get enough of your daily calories from protein. Ask your health care provider how many of your calories should come from protein.  Talk with a health care provider, such as a diet and nutrition specialist (dietitian), about how much protein and how many calories you need each day. Follow the diet as directed by your health care provider.  What are tips for following this plan?    Preparing meals  Add whole milk, half-and-half, or heavy cream to cereal, pudding, soup, or hot cocoa.  Add whole milk to instant breakfast drinks.  Add peanut butter to oatmeal or smoothies.  Add powdered milk to baked goods, smoothies, or milkshakes.  Add powdered milk, cream, or butter to mashed potatoes.  Add cheese to cooked vegetables.  Make whole-milk yogurt parfaits. Top them with granola, fruit, or nuts.  Add cottage cheese to your fruit.  Add avocado, cheese, or both to sandwiches or salads.  Add meat, poultry, or seafood to rice, pasta, casseroles, salads, and soups.  Use mayonnaise when making egg salad, chicken salad, or tuna salad.  Use peanut butter as a dip for vegetables or as a topping for pretzels, celery, or crackers.  Add beans to casseroles, dips, and spreads.  Add pureed beans to sauces and soups.  Replace calorie-free drinks with calorie-containing drinks, such as milk and fruit juice.  Replace water with milk or heavy cream when making foods such as oatmeal, pudding, or cocoa.  General instructions  Ask your health care provider if you should take a nutritional supplement.  Try to eat six small meals each day instead of three large meals.  Eat a balanced diet. In each meal, include one food that is high in protein.  Keep nutritious snacks available, such as nuts, trail mixes, dried fruit, and yogurt.  If you have kidney disease or diabetes, talk with your health care provider about how much protein is safe for you. Too much protein may put extra stress on your kidneys.  Drink your  calories. Choose high-calorie drinks and have them after your meals.  What high-protein foods should I eat?    Vegetables  Soybeans. Peas.  Grains  Quinoa. Bulgur wheat.  Meats and other proteins  Beef, pork, and poultry. Fish and seafood. Eggs. Tofu. Textured vegetable protein (TVP). Peanut butter. Nuts and seeds. Dried beans. Protein powders.  Dairy  Whole milk. Whole-milk yogurt. Powdered milk. Cheese. Cottage Cheese. Eggnog.  Beverages  High-protein supplement drinks. Soy milk.  Other foods  Protein bars.  The items listed above may not be a complete list of high-protein foods and beverages. Contact a dietitian for more options.  What high-calorie foods should I eat?  Fruits  Dried fruit. Fruit leather. Canned fruit in syrup. Fruit juice. Avocado.  Vegetables  Vegetables cooked in oil or butter. Fried potatoes.  Grains  Pasta. Quick breads. Muffins. Pancakes. Ready-to-eat cereal.  Meats and other proteins  Peanut butter. Nuts and seeds.  Dairy  Heavy cream. Whipped cream. Cream cheese. Sour cream. Ice cream. Custard. Pudding.  Beverages  Meal-replacement beverages. Nutrition shakes. Fruit juice. Sugar-sweetened soft drinks.  Seasonings and condiments  Salad dressing. Mayonnaise. Dat sauce. Fruit preserves or jelly. Honey. Syrup.  Sweets and desserts  Cake. Cookies. Pie. Pastries. Candy bars. Chocolate.  Fats and oils  Butter or margarine. Oil. Gravy.  Other foods  Meal-replacement bars.  The items listed above may not be a complete list of high-calorie foods and beverages. Contact a dietitian for more options.  Summary  A high-protein, high-calorie diet can help you gain weight or heal faster after an injury, illness, or surgery.  To increase your protein and calories, add ingredients such as whole milk, peanut butter, cheese, beans, meat, or seafood to meal items.  To get enough extra calories each day, include high-calorie foods and beverages at each meal.  Adding a high-calorie drink or shake can be an  easy way to help you get enough calories each day. Talk with your healthcare provider or dietitian about the best options for you.  This information is not intended to replace advice given to you by your health care provider. Make sure you discuss any questions you have with your health care provider.  Document Revised: 11/30/2018 Document Reviewed: 10/30/2018  [employer] Patient Education © 2021 [employer]

## 2024-01-05 NOTE — H&P (VIEW-ONLY)
Chief complaint:   Chief Complaint   Patient presents with    Back Pain     Pt here for f/u/e. Pt states since his last office visit his symptoms have not improved.      Subjective     HPI:   Last encounter: 10/31/2023    Interval History: Hermilo Mccord is a 63 y.o.  male who presents today for continued evaluation of lumbar back and bilateral leg pain, 50% back, 50% legs.  He has had low back pain for years.  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  In August 2023 he participated in an 8-hour drive to [place].  This resulted in worsening severe back pain and now radiated into his legs.    Mr. Mccord recently completed 9/15 sessions of physical therapy with an overall worsening in his symptoms and it was discontinued by therapist due to concern for his neurological function.    Joseph currently reports 9 out of 10 pain.  This is located in his back radiates down his left posterior thigh stops at his knee.  It radiates down his right posterior thigh to his right lateral calf and into his right dorsum of his foot and big toe most consistent with an L4 distribution.  He has numbness and tingling.  Weakness in his right leg and often give out.  No loss of fine motor function.  Arrives in wheelchair today.  And has difficulty walking upstairs particular with his right foot catches doing stairs.  He also finds that when he begins to urinate he cannot stop going.  His pain is exacerbated by standing upright and straightening his legs.  It is ameliorated by leaning forward using a shopping cart.    Alternative therapies include a physician directed course of physical therapy which was discontinued by the physical therapist due to failure to prevent worsening neurological function.  He is also performed chiropractic with Dr. Vianca Ramos in [place].  Additionally he has tried an appropriate course of gabapentin, meloxicam, Norco, and Flexeril without significant improvement.    Oswestry Disability  Index = 50%   Score   Pain Intensity Moderate pain-2   Personal Care Look after myslef but very painful-1   Lifting Only very light weights-4   Walking Pain prevents > 100 yards-3   Sitting Pain prevents sitting > 1 hr-2   Standing Pain limits standing to < 10 min-4   Sleeping Can only sleep < 4 hrs-3   Sex Life (if applicable) Sex causes extra pain-2   Social Life I do not go out as often because of pain-3   Traveling Pain is bad but trips over 2 hrs-2   (Dennison et al, 1980)    SCORE INTERPRETATION OF THE OSWESTRY LBP DISABILITY QUESTIONNAIRE     40-60% Severe disability Pain remains the main problem in this group of patients, but travel, personal care, social life, sexual activity, and sleep are also affected.  These patients require detailed investigation.     ROS  Review of Systems   Constitutional: Negative.    HENT: Negative.     Eyes: Negative.    Respiratory: Negative.     Cardiovascular: Negative.    Gastrointestinal: Negative.    Endocrine: Negative.    Genitourinary: Negative.    Musculoskeletal:  Positive for back pain and gait problem.   Skin: Negative.    Allergic/Immunologic: Negative.    Neurological:  Positive for numbness.   Hematological: Negative.    Psychiatric/Behavioral: Negative.     All other systems reviewed and are negative.    PFSH:  Past Medical History:   Diagnosis Date    Diabetes mellitus     GERD (gastroesophageal reflux disease)     Hyperlipidemia     Hypertension     Kidney stone     Low back pain     Numbness     left-sided intermittently      Past Surgical History:   Procedure Laterality Date    APPENDECTOMY      CARPAL TUNNEL RELEASE Bilateral     CYSTOSCOPY, RETROGRADE PYELOGRAM AND STENT INSERTION Left 6/24/2021    Procedure: CYSTOSCOPY RETROGRADE PYELOGRAM AND STENT INSERTION;  Surgeon: Jamil Santiago MD;  Location: [facility];  Service: Urology;  Laterality: Left;    ELBOW PROCEDURE Right     KNEE SURGERY Bilateral     SINUS SURGERY      URETEROSCOPY LASER LITHOTRIPSY  "WITH STENT INSERTION Left 7/9/2021    Procedure: URETEROSCOPY LASER LITHOTRIPSY WITH STENT INSERTION;  Surgeon: Jamil Santiago MD;  Location: [facility];  Service: Urology;  Laterality: Left;     Objective      Current Outpatient Medications   Medication Sig Dispense Refill    aspirin 81 MG chewable tablet Chew 1 tablet Daily.      cyclobenzaprine (FLEXERIL) 10 MG tablet Take 1 tablet by mouth At Night As Needed for Muscle Spasms. 30 tablet 0    desloratadine (CLARINEX) 5 MG tablet Take 1 tablet by mouth Daily.      Entresto 49-51 MG tablet Take 1 tablet by mouth 2 (Two) Times a Day.      esomeprazole (nexIUM) 40 MG capsule Take 1 capsule by mouth Every Morning Before Breakfast.      furosemide (LASIX) 40 MG tablet Take 1 tablet by mouth Daily.      glimepiride (AMARYL) 2 MG tablet Take 1 tablet by mouth Every Morning Before Breakfast.      HYDROcodone-acetaminophen (NORCO)  MG per tablet       Jardiance 25 MG tablet tablet Take 1 tablet by mouth Daily.      labetalol (NORMODYNE) 100 MG tablet Take 2 tablets by mouth Every 12 (Twelve) Hours.      meloxicam (MOBIC) 15 MG tablet TAKE 1 TABLET BY MOUTH EVERY DAY WITH A MEAL      simvastatin (ZOCOR) 40 MG tablet Take 1 tablet by mouth Every Night.      gabapentin (NEURONTIN) 100 MG capsule Take 1 capsule by mouth 3 (Three) Times a Day for 3 days, THEN 3 capsules 3 (Three) Times a Day for 11 days. 108 capsule 0     No current facility-administered medications for this visit.     Vital Signs  Ht 198.1 cm (78\")   Wt 136 kg (300 lb)   BMI 34.67 kg/m²   Physical Exam  Vitals and nursing note reviewed.   Constitutional:       General: He is not in acute distress.     Appearance: Normal appearance. He is well-developed and well-groomed. He is obese. He is not ill-appearing, toxic-appearing or diaphoretic.      Comments: BMI 35.18   HENT:      Head: Normocephalic and atraumatic.      Right Ear: Hearing normal.      Left Ear: Hearing normal.   Eyes:      Extraocular " Movements: EOM normal.      Conjunctiva/sclera: Conjunctivae normal.      Pupils: Pupils are equal, round, and reactive to light.   Neck:      Trachea: Trachea normal.   Cardiovascular:      Rate and Rhythm: Normal rate and regular rhythm.   Pulmonary:      Effort: Pulmonary effort is normal. No tachypnea, bradypnea, accessory muscle usage or respiratory distress.   Abdominal:      Palpations: Abdomen is soft.   Musculoskeletal:      Cervical back: Full passive range of motion without pain and neck supple.   Skin:     General: Skin is warm and dry.   Neurological:      Mental Status: He is alert and oriented to person, place, and time.      GCS: GCS eye subscore is 4. GCS verbal subscore is 5. GCS motor subscore is 6.      Deep Tendon Reflexes:      Reflex Scores:       Tricep reflexes are 3+ on the right side and 2+ on the left side.       Bicep reflexes are 3+ on the right side and 3+ on the left side.       Brachioradialis reflexes are 3+ on the right side and 3+ on the left side.       Patellar reflexes are 3+ on the right side and 3+ on the left side.       Achilles reflexes are 1+ on the right side and 1+ on the left side.  Psychiatric:         Speech: Speech normal.         Behavior: Behavior normal. Behavior is cooperative.       Neurologic Exam     Mental Status   Oriented to person, place, and time.   Attention: normal. Concentration: normal.   Speech: speech is normal   Level of consciousness: alert    Cranial Nerves     CN II   Visual fields full to confrontation.     CN III, IV, VI   Pupils are equal, round, and reactive to light.  Extraocular motions are normal.     CN V   Facial sensation intact.     CN VII   Facial expression full, symmetric.     CN VIII   CN VIII normal.     CN IX, X   CN IX normal.     CN XI   CN XI normal.     Motor Exam   Right arm tone: normal  Left arm tone: normal  Right leg tone: normal  Left leg tone: normal    Strength   Right deltoid: 5/5  Left deltoid: 5/5  Right biceps:  5/5  Left biceps: 5/5  Right triceps: 5/5  Left triceps: 5/5  Right wrist extension: 5/5  Left wrist extension: 5/5  Right iliopsoas: 5/5  Left iliopsoas: 5/5  Right quadriceps: 5/5  Left quadriceps: 5/5  Right anterior tibial: 4/5  Left anterior tibial: 5/5  Right gastroc: 5/5  Left gastroc: 5/5  Right EHL 5/5  Left EHL 5/5       Sensory Exam   Right arm light touch: normal  Left arm light touch: normal  Right leg light touch: decreased from knee  Left leg light touch: normal  Sensory deficit distribution on right: L4    Gait, Coordination, and Reflexes     Tremor   Resting tremor: absent  Intention tremor: absent  Action tremor: absent    Reflexes   Right brachioradialis: 3+  Left brachioradialis: 3+  Right biceps: 3+  Left biceps: 3+  Right triceps: 3+  Left triceps: 2+  Right patellar: 3+  Left patellar: 3+  Right achilles: 1+  Left achilles: 1+  Right plantar: upgoing  Left plantar: upgoing  Right Elder: absent  Left Elder: absent  Right ankle clonus: absent  Left ankle clonus: absent  Right pendular knee jerk: absent  Left pendular knee jerk: absent  Kyphotic and antalgic gait   (12 bullet pts)    Results Review:   8/8/2023      10/31/2023      MRI lumbar spine 1/3/24 -severe lumbar stenosis at L4/5      Assessment/Plan:   Hermilo Mccord is a 63 y.o. male with a significant medical history of hypertension, hyperlipidemia, GERD, diabetes, and obesity.  He presents with a known problem of lumbar back and bilateral leg pain in the l4 distribution, right > left, 50% legs, 50% back. CHOCO: 36,50.  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  His imaging shows no evidence of acute fractures, anteriolisthesis of L4 over L5 that appears relatively unchanged in the upright versus supine position, and moderate multilevel degenerative disc disease.  MRI of the lumbar spine with severe stenosis at L4/5      Recommendations:  Mechanical lumbar back pain  Bilateral leg pain in the L4 distribution, right > left  Neurogenic Claudication  Lumbar Stenosis  Differential diagnosis: Vascular claudication, hip pathology including trochanteric bursitis, disc herniation, facet arthropathy pain, Baastrup syndrome, arachnoiditis, intraspinal neoplasm, diabetic neuritis, or functional etiologies.    Hermilo presents with a cardinal signs and symptoms concerning for neurogenic claudication.  This includes a slowly progressive unilateral bilateral buttock, hip, thigh or leg discomfort that is precipitated by standing or walking and characteristically relieved by changing position or posture usually bending forward or flexing.  However, acute unrelenting pain of the lumbar spine is not characteristic and other causes should be sought.  18% of cases present within normal neurological exam.      Treatment Options  Continued Conservative management  Surgical decompression is recommended for patients with symptomatic neurogenic claudication due to lumbar stenosis without spondylolisthesis who elect to undergo surgical intervention (Level II/III evidence; B).    In the absence of deformity or instability, lumbar fusion has not been shown to improve outcomes in patients with isolated stenosis, and therefore it is not recommended (Level IV evidence; C).    Hermilo and SAMSON discussed the risk benefits and possible complications of conservative management versus minimally invasive hemilaminectomy versus open laminectomy versus instrumented fusion.  Complications discussed included risk of anesthesia, usual spine surgery complications including surgical site infection 6%, hematoma, need for return to the operating room, DVT 3%, unintended durotomy 1% to 13%, weakness numbness or tingling in the lower extremities including up to paralysis.  Plus risk of reherniation of the disc in the same place in 6% of cases, failure to fully decompress the spine  at the time of surgery, and the persistence of low back pain despite improvement in radiculopathy.  15% need for fusion at a later date.    Hermilo black elected to proceed with - Lumbar Hemilaminectomy with medial facetectomy and foraminotomy, minimally invasive, right L4/5 .    Hyperreflexia with bilateral Babinski's  Based on this I think the most prudent course would be to obtain a MRI of the cervical spine.    Class 2 obesity (BMI 35.0-39.9) due to excessive calories with serious comorbidities BMI of 35.0-35.9 in adult  Body mass index is 34.67 kg/m². Information on the DASH diet provided in the AVS.  We will continue to provided diet and exercise information with the goal of weight loss at each scheduled appointment.     Diagnoses and all orders for this visit:    1. Spinal stenosis, lumbar region, with neurogenic claudication (Primary)  -     Case Request; Standing  -     CBC & Differential; Future  -     Comprehensive Metabolic Panel; Future  -     MRSA Screen Culture (Outpatient) - Swab, Nares; Future  -     Type & Screen; Future  -     ECG 12 Lead; Future  -     XR Chest 1 View; Future  -     ceFAZolin (ANCEF) 2 g in sodium chloride 0.9 % 100 mL IVPB  -     chlorhexidine (PERIDEX) 0.12 % solution 15 mL  -     Case Request    2. Lumbar back pain    3. Pain in both lower extremities    4. Cervical myelopathy  -     MRI Cervical Spine Without Contrast; Future    5. Numbness and tingling of left lower extremity    6. Class 1 obesity due to excess calories with serious comorbidity and body mass index (BMI) of 34.0 to 34.9 in adult    7. Non-smoker    Other orders  -     Inpatient Admission; Standing  -     Follow Anesthesia Guidelines / Protocol; Future  -     Follow Anesthesia Guidelines / Protocol; Standing  -     Verify NPO Status; Standing  -     Obtain Informed Consent; Standing  -     SCD (Sequential Compression Device) - Place on Patient in Pre-Op; Standing  -     Clip Operative Site; Standing  -     Have  Patient Void Prior to Procedure; Standing  -     Verify / Perform Chlorhexidine Skin Prep; Standing  -     Obtain Informed Consent; Future  -     Provide NPO Instructions to Patient; Future  -     Chlorhexidine Skin Prep; Future  -     Provide Patient with Enhanced Recovery Booklet(s) or Handout; Future  -     Type & Screen; Standing        Return for POSTOPERATIVELY.    Thank you, for allowing me to continue to participate in the care of this patient.    I spent 29 minutes caring for Hermilo on this date of service. This time includes time spent by me in the following activities: preparing for the visit, reviewing tests, obtaining and/or reviewing a separately obtained history, performing a medically appropriate examination and/or evaluation, counseling and educating the patient/family/caregiver, ordering medications, tests, or procedures, referring and communicating with other health care professionals, documenting information in the medical record, independently interpreting results and communicating that information with the patient/family/caregiver, and/or care coordination.     Medical Decision Making (2/3)  Problem Points (2,3,4 or more)  Threat to life or body ex. Abrupt change in neurological exam (High)  Undiagnosed new problem (Mod)  Data Points (2,3,4 or more)  CATEGORY 1  Imaging Ordered (X-ray,CT, MRI) = 2.  Imaging Independent Review = 3  Lab ordered = 2  CATEGORY 2  Independent interpretation of test performed by another physician/NPP (Cat 2)  CATEGORY 3  Risk (Low, Mod, High)  Surgery: Major surgery with Risk Factors (High)    E/M = MDM 2 out of 3   or  Time  Est Level 2 - 39722 = High + (Same as Above but 2 of 3) + High Risk   or  60-74 min      Sincerely,  Jhonatan Contreras MD

## 2024-01-08 ENCOUNTER — HOSPITAL ENCOUNTER (OUTPATIENT)
Dept: MRI IMAGING | Facility: HOSPITAL | Age: 64
Discharge: HOME | End: 2024-01-08
Admitting: NEUROLOGICAL SURGERY
Payer: COMMERCIAL

## 2024-01-08 DIAGNOSIS — G95.9 CERVICAL MYELOPATHY: ICD-10-CM

## 2024-01-08 PROBLEM — M48.062 SPINAL STENOSIS, LUMBAR REGION, WITH NEUROGENIC CLAUDICATION: Status: ACTIVE | Noted: 2024-01-05

## 2024-01-08 PROCEDURE — 72141 MRI NECK SPINE W/O DYE: CPT

## 2024-01-09 NOTE — PROGRESS NOTES
MRI cervical spine without compression.  We can proceed with surgery.  If still has babinski on FU exam then will image thoracic spine.

## 2024-01-10 ENCOUNTER — TELEPHONE (OUTPATIENT)
Dept: NEUROSURGERY | Facility: CLINIC | Age: 64
End: 2024-01-10

## 2024-01-10 NOTE — TELEPHONE ENCOUNTER
The [facility] received a fax that requires your attention. The document has been indexed to the patient’s chart for your review.      Reason for sending: SO PROVIDER IS AWARE OF DOCUMENT IN PATIENT'S CHART    Documents Description: PROGRESS NOTE FOR SURGERY CLEARANCE 1/9/24    Name of Sender: [employer]    Date Indexed: 1/10/24

## 2024-01-11 ENCOUNTER — PRE-ADMISSION TESTING (OUTPATIENT)
Dept: PREADMISSION TESTING | Facility: HOSPITAL | Age: 64
End: 2024-01-11
Payer: COMMERCIAL

## 2024-01-11 ENCOUNTER — HOSPITAL ENCOUNTER (OUTPATIENT)
Dept: GENERAL RADIOLOGY | Facility: HOSPITAL | Age: 64
Discharge: HOME | End: 2024-01-11
Payer: COMMERCIAL

## 2024-01-11 VITALS
HEART RATE: 65 BPM | HEIGHT: 78 IN | WEIGHT: 299.83 LBS | BODY MASS INDEX: 34.69 KG/M2 | DIASTOLIC BLOOD PRESSURE: 72 MMHG | OXYGEN SATURATION: 97 % | SYSTOLIC BLOOD PRESSURE: 119 MMHG | RESPIRATION RATE: 18 BRPM

## 2024-01-11 DIAGNOSIS — M48.062 SPINAL STENOSIS, LUMBAR REGION, WITH NEUROGENIC CLAUDICATION: ICD-10-CM

## 2024-01-11 LAB
ALBUMIN SERPL-MCNC: 4.1 G/DL (ref 3.5–5.2)
ALBUMIN/GLOB SERPL: 1.3 G/DL
ALP SERPL-CCNC: 95 U/L (ref 39–117)
ALT SERPL W P-5'-P-CCNC: 29 U/L (ref 1–41)
ANION GAP SERPL CALCULATED.3IONS-SCNC: 12 MMOL/L (ref 5–15)
AST SERPL-CCNC: 19 U/L (ref 1–40)
BASOPHILS # BLD AUTO: 0.03 10*3/MM3 (ref 0–0.2)
BASOPHILS NFR BLD AUTO: 0.4 % (ref 0–1.5)
BILIRUB SERPL-MCNC: 0.4 MG/DL (ref 0–1.2)
BUN SERPL-MCNC: 20 MG/DL (ref 8–23)
BUN/CREAT SERPL: 16.8 (ref 7–25)
CALCIUM SPEC-SCNC: 9 MG/DL (ref 8.6–10.5)
CHLORIDE SERPL-SCNC: 104 MMOL/L (ref 98–107)
CO2 SERPL-SCNC: 21 MMOL/L (ref 22–29)
CREAT SERPL-MCNC: 1.19 MG/DL (ref 0.76–1.27)
DEPRECATED RDW RBC AUTO: 43 FL (ref 37–54)
EGFRCR SERPLBLD CKD-EPI 2021: 68.6 ML/MIN/1.73
EOSINOPHIL # BLD AUTO: 0.22 10*3/MM3 (ref 0–0.4)
EOSINOPHIL NFR BLD AUTO: 3.1 % (ref 0.3–6.2)
ERYTHROCYTE [DISTWIDTH] IN BLOOD BY AUTOMATED COUNT: 12.4 % (ref 12.3–15.4)
GLOBULIN UR ELPH-MCNC: 3.2 GM/DL
GLUCOSE SERPL-MCNC: 329 MG/DL (ref 65–99)
HCT VFR BLD AUTO: 43.5 % (ref 37.5–51)
HGB BLD-MCNC: 14 G/DL (ref 13–17.7)
IMM GRANULOCYTES # BLD AUTO: 0.03 10*3/MM3 (ref 0–0.05)
IMM GRANULOCYTES NFR BLD AUTO: 0.4 % (ref 0–0.5)
LYMPHOCYTES # BLD AUTO: 1.42 10*3/MM3 (ref 0.7–3.1)
LYMPHOCYTES NFR BLD AUTO: 20.3 % (ref 19.6–45.3)
MCH RBC QN AUTO: 30.8 PG (ref 26.6–33)
MCHC RBC AUTO-ENTMCNC: 32.2 G/DL (ref 31.5–35.7)
MCV RBC AUTO: 95.6 FL (ref 79–97)
MONOCYTES # BLD AUTO: 0.52 10*3/MM3 (ref 0.1–0.9)
MONOCYTES NFR BLD AUTO: 7.4 % (ref 5–12)
NEUTROPHILS NFR BLD AUTO: 4.77 10*3/MM3 (ref 1.7–7)
NEUTROPHILS NFR BLD AUTO: 68.4 % (ref 42.7–76)
NRBC BLD AUTO-RTO: 0 /100 WBC (ref 0–0.2)
PLATELET # BLD AUTO: 194 10*3/MM3 (ref 140–450)
PMV BLD AUTO: 10.4 FL (ref 6–12)
POTASSIUM SERPL-SCNC: 3.7 MMOL/L (ref 3.5–5.2)
PROT SERPL-MCNC: 7.3 G/DL (ref 6–8.5)
RBC # BLD AUTO: 4.55 10*6/MM3 (ref 4.14–5.8)
SODIUM SERPL-SCNC: 137 MMOL/L (ref 136–145)
WBC NRBC COR # BLD AUTO: 6.99 10*3/MM3 (ref 3.4–10.8)

## 2024-01-11 PROCEDURE — 80053 COMPREHEN METABOLIC PANEL: CPT

## 2024-01-11 PROCEDURE — 93005 ELECTROCARDIOGRAM TRACING: CPT

## 2024-01-11 PROCEDURE — 36415 COLL VENOUS BLD VENIPUNCTURE: CPT

## 2024-01-11 PROCEDURE — 71045 X-RAY EXAM CHEST 1 VIEW: CPT

## 2024-01-11 PROCEDURE — 87081 CULTURE SCREEN ONLY: CPT

## 2024-01-11 PROCEDURE — 93010 ELECTROCARDIOGRAM REPORT: CPT | Performed by: HOSPITALIST

## 2024-01-11 PROCEDURE — 85025 COMPLETE CBC W/AUTO DIFF WBC: CPT

## 2024-01-11 NOTE — DISCHARGE INSTRUCTIONS
Before you come to the hospital        Arrival time: AS DIRECTED BY OFFICE     YOU MAY TAKE THE FOLLOWING MEDICATION(S) THE MORNING OF SURGERY WITH A SIP OF WATER: nexium, labetolol, and norco  Hold entresto for 24 hours prior to surgery           ALL OTHER HOME MEDICATION CHECK WITH YOUR PHYSICIAN (especially if   you are taking diabetes medicines or blood thinners)    Do not take any Erectile Dysfunction medications (EX: CIALIS, VIAGRA) 24 hours prior to surgery.      If you were given and instructed to use a germ- killing soap, use as directed the night before surgery and again the morning of surgery or as directed by your surgeon. (Use one-half of the bottle with each shower.)   See attached information for How to Use Chlorhexidine for Bathing if applicable.            Eating and drinking restrictions prior to scheduled arrival time    2 Hours before arrival time STOP   Drinking Clear liquids (water, black coffee-NO CREAM,  apple juice-no pulp)    Clear Liquids    Water and flavored water                                                                      Clear Fruit juices, such as cranberry juice and apple juice.  Black coffee (NO cream of any kind, including powdered).  Plain tea  Clear bouillon or broth.  Flavored gelatin.  Soda.  Gatorade or Powerade.    8 Hours before arrival time STOP   All food, full liquids, and dairy products  Full liquid examples  Juices that have pulp.  Frozen ice pops that contain fruit pieces.  Coffee with creamer  Milk.  Yogurt.    (It is extremely important that you follow these guidelines to prevent delay or cancelation of your procedure)                       MANAGING PAIN AFTER SURGERY    We know you are probably wondering what your pain will be like after surgery.  Following surgery it is unrealistic to expect you will not have pain.   Pain is how our bodies let us know that something is wrong or cautions us to be careful.  That said, our goal is to make your pain  tolerable.    Methods we may use to treat your pain include (oral or IV medications, PCAs, epidurals, nerve blocks, etc.)   While some procedures require IV pain medications for a short time after surgery, transitioning to pain medications by mouth allows for better management of pain.   Your nurse will encourage you to take oral pain medications whenever possible.  IV medications work almost immediately, but only last a short while.  Taking medications by mouth allows for a more constant level of medication in your blood stream for a longer period of time.      Once your pain is out of control it is harder to get back under control.  It is important you are aware when your next dose of pain medication is due.  If you are admitted, your nurse may write the time of your next dose on the white board in your room to help you remember.      We are interested in your pain and encourage you to inform us about aggravating factors during your visit.   Many times a simple repositioning every few hours can make a big difference.    If your physician says it is okay, do not let your pain prevent you from getting out of bed. Be sure to call your nurse for assistance prior to getting up so you do not fall.      Before surgery, please decide your tolerable pain goal.  These faces help describe the pain ratings we use on a 0-10 scale.   Be prepared to tell us your goal and whether or not you take pain or anxiety medications at home.          Preparing for Surgery  Preparing for surgery is an important part of your care. It can make things go more smoothly and help you avoid complications. The steps leading up to surgery may vary among hospitals. Follow all instructions given to you by your health care providers. Ask questions if you do not understand something. Talk about any concerns that you have.  Here are some questions to consider asking before your surgery:  If my surgery is not an emergency (is elective), when would be the  best time to have the surgery?  What arrangements do I need to make for work, home, or school?  What will my recovery be like? How long will it be before I can return to normal activities?  Will I need to prepare my home? Will I need to arrange care for me or my children?  Should I expect to have pain after surgery? What are my pain management options? Are there nonmedical options that I can try for pain?  Tell a health care provider about:  Any allergies you have.  All medicines you are taking, including vitamins, herbs, eye drops, creams, and over-the-counter medicines.  Any problems you or family members have had with anesthetic medicines.  Any blood disorders you have.  Any surgeries you have had.  Any medical conditions you have.  Whether you are pregnant or may be pregnant.  What are the risks?  The risks and complications of surgery depend on the specific procedure that you have. Discuss all the risks with your health care providers before your surgery. Ask about common surgical complications, which may include:  Infection.  Bleeding or a need for blood replacement (transfusion).  Allergic reactions to medicines.  Damage to surrounding nerves, tissues, or structures.  A blood clot.  Scarring.  Failure of the surgery to correct the problem.  Follow these instructions before the procedure:  Several days or weeks before your procedure  You may have a physical exam by your primary health care provider to make sure it is safe for you to have surgery.  You may have testing. This may include a chest X-ray, blood and urine tests, electrocardiogram (ECG), or other testing.  Ask your health care provider about:  Changing or stopping your regular medicines. This is especially important if you are taking diabetes medicines or blood thinners.  Taking medicines such as aspirin and ibuprofen. These medicines can thin your blood. Do not take these medicines unless your health care provider tells you to take them.  Taking  over-the-counter medicines, vitamins, herbs, and supplements.  Do not use any products that contain nicotine or tobacco, such as cigarettes and e-cigarettes. If you need help quitting, ask your health care provider.  Avoid alcohol.  Ask your health care provider if there are exercises you can do to prepare for surgery.  Eat a healthy diet.   Plan to have someone 18 years of age or older to take you home from the hospital. We will need to verify your ride on the morning of surgery if you are being discharged home on the same day. Tell your ride to be expecting a call from the hospital prior to your procedure.   Plan to have a responsible adult care for you for at least 24 hours after you leave the hospital or clinic. This is important.  The day before your procedure  You may be given antibiotic medicine to take by mouth to help prevent infection. Take it as told by your health care provider.  You may be asked to shower with a germ-killing soap.  Follow instructions from your health care provider about eating and drinking restrictions. This includes gum, mints and hard candy.  Pack comfortable clothes according to your procedure.   The day of your procedure  You may need to take another shower with a germ-killing soap before you leave home in the morning.  With a small sip of water, take only the medicines that you are told to take.  Remove all jewelry including rings.   Leave anything you consider valuable at home except hearing aids if needed.  You do not need to bring your home medications into the hospital.   Do not wear any makeup, nail polish, powder, deodorant, lotion, hair accessories, or anything on your skin or body except your clothes.  If you will be staying in the hospital, bring a case to hold your glasses, contacts, or dentures. You may also want to bring your robe and non-skid footwear.       (Do not use denture adhesives since you will be asked to remove them during  surgery).   If you wear oxygen at  home, bring it with you the day of surgery.  If instructed by your health care provider, bring your sleep apnea device with you on the day of your surgery (if this applies to you).  You may want to leave your suitcase and sleep apnea device in the car until after surgery.   Arrive at the hospital as scheduled.  Bring a friend or family member with you who can help to answer questions and be present while you meet with your health care provider.  At the hospital  When you arrive at the hospital:  Go to registration located at the main entrance of the hospital. You will be registered and given a beeper and a sticker sheet. Take the stickers to the Outpatient nurses desk and place in the black tray. This is to notify staff that you have arrived. Then return to the lobby to wait.   When your beeper lights up and vibrates proceed through the double doors, under the stairs, and a member of the Outpatient Surgery staff will escort you to your preoperative room.  You may have to wear compression sleeves. These help to prevent blood clots and reduce swelling in your legs.  An IV may be inserted into one of your veins.              In the operating room, you may be given one or more of the following:        A medicine to help you relax (sedative).        A medicine to numb the area (local anesthetic).        A medicine to make you fall asleep (general anesthetic).        A medicine that is injected into an area of your body to numb everything below the                      injection site (regional anesthetic).  You may be given an antibiotic through your IV to help prevent infection.  Your surgical site will be marked or identified.    Contact a health care provider if you:  Develop a fever of more than 100.4°F (38°C) or other feelings of illness during the 48 hours before your surgery.  Have symptoms that get worse.  Have questions or concerns about your surgery.  Summary  Preparing for surgery can make the procedure go more  smoothly and lower your risk of complications.  Before surgery, make a list of questions and concerns to discuss with your surgeon. Ask about the risks and possible complications.  In the days or weeks before your surgery, follow all instructions from your health care provider. You may need to stop smoking, avoid alcohol, follow eating restrictions, and change or stop your regular medicines.  Contact your surgeon if you develop a fever or other signs of illness during the few days before your surgery.  This information is not intended to replace advice given to you by your health care provider. Make sure you discuss any questions you have with your health care provider.  Document Revised: 12/21/2018 Document Reviewed: 10/23/2018  [employer] Patient Education © 2021 [employer]         How to Use Chlorhexidine Before Surgery  Chlorhexidine gluconate (CHG) is a germ-killing (antiseptic) solution that is used to clean the skin. It can get rid of the bacteria that normally live on the skin and can keep them away for about 24 hours. To clean your skin with CHG, you may be given:  A CHG solution to use in the shower or as part of a sponge bath.  A prepackaged cloth that contains CHG.  Cleaning your skin with CHG may help lower the risk for infection:  While you are staying in the intensive care unit of the hospital.  If you have a vascular access, such as a central line, to provide short-term or long-term access to your veins.  If you have a catheter to drain urine from your bladder.  If you are on a ventilator. A ventilator is a machine that helps you breathe by moving air in and out of your lungs.  After surgery.  What are the risks?  Risks of using CHG include:  A skin reaction.  Hearing loss, if CHG gets in your ears and you have a perforated eardrum.  Eye injury, if CHG gets in your eyes and is not rinsed out.  The CHG product catching fire.  Make sure that you avoid smoking and flames after applying CHG to your  skin.  Do not use CHG:  If you have a chlorhexidine allergy or have previously reacted to chlorhexidine.  On babies younger than 2 months of age.  How to use CHG solution  Use CHG only as told by your health care provider, and follow the instructions on the label.  Use the full amount of CHG as directed. Usually, this is one bottle.  During a shower    Follow these steps when using CHG solution during a shower (unless your health care provider gives you different instructions):  Start the shower.  Use your normal soap and shampoo to wash your face and hair.  Turn off the shower or move out of the shower stream.  Pour the CHG onto a clean washcloth. Do not use any type of brush or rough-edged sponge.  Starting at your neck, lather your body down to your toes. Make sure you follow these instructions:  If you will be having surgery, pay special attention to the part of your body where you will be having surgery. Scrub this area for at least 1 minute.  Do not use CHG on your head or face. If the solution gets into your ears or eyes, rinse them well with water.  Avoid your genital area.  Avoid any areas of skin that have broken skin, cuts, or scrapes.  Scrub your back and under your arms. Make sure to wash skin folds.  Let the lather sit on your skin for 1-2 minutes or as long as told by your health care provider.  Thoroughly rinse your entire body in the shower. Make sure that all body creases and crevices are rinsed well.  Dry off with a clean towel. Do not put any substances on your body afterward--such as powder, lotion, or perfume--unless you are told to do so by your health care provider. Only use lotions that are recommended by the .  Put on clean clothes or pajamas.  If it is the night before your surgery, sleep in clean sheets.     During a sponge bath  Follow these steps when using CHG solution during a sponge bath (unless your health care provider gives you different instructions):  Use your normal  soap and shampoo to wash your face and hair.  Pour the CHG onto a clean washcloth.  Starting at your neck, lather your body down to your toes. Make sure you follow these instructions:  If you will be having surgery, pay special attention to the part of your body where you will be having surgery. Scrub this area for at least 1 minute.  Do not use CHG on your head or face. If the solution gets into your ears or eyes, rinse them well with water.  Avoid your genital area.  Avoid any areas of skin that have broken skin, cuts, or scrapes.  Scrub your back and under your arms. Make sure to wash skin folds.  Let the lather sit on your skin for 1-2 minutes or as long as told by your health care provider.  Using a different clean, wet washcloth, thoroughly rinse your entire body. Make sure that all body creases and crevices are rinsed well.  Dry off with a clean towel. Do not put any substances on your body afterward--such as powder, lotion, or perfume--unless you are told to do so by your health care provider. Only use lotions that are recommended by the .  Put on clean clothes or pajamas.  If it is the night before your surgery, sleep in clean sheets.  How to use CHG prepackaged cloths  Only use CHG cloths as told by your health care provider, and follow the instructions on the label.  Use the CHG cloth on clean, dry skin.  Do not use the CHG cloth on your head or face unless your health care provider tells you to.  When washing with the CHG cloth:  Avoid your genital area.  Avoid any areas of skin that have broken skin, cuts, or scrapes.  Before surgery    Follow these steps when using a CHG cloth to clean before surgery (unless your health care provider gives you different instructions):  Using the CHG cloth, vigorously scrub the part of your body where you will be having surgery. Scrub using a back-and-forth motion for 3 minutes. The area on your body should be completely wet with CHG when you are done  scrubbing.  Do not rinse. Discard the cloth and let the area air-dry. Do not put any substances on the area afterward, such as powder, lotion, or perfume.  Put on clean clothes or pajamas.  If it is the night before your surgery, sleep in clean sheets.     For general bathing  Follow these steps when using CHG cloths for general bathing (unless your health care provider gives you different instructions).  Use a separate CHG cloth for each area of your body. Make sure you wash between any folds of skin and between your fingers and toes. Wash your body in the following order, switching to a new cloth after each step:  The front of your neck, shoulders, and chest.  Both of your arms, under your arms, and your hands.  Your stomach and groin area, avoiding the genitals.  Your right leg and foot.  Your left leg and foot.  The back of your neck, your back, and your buttocks.  Do not rinse. Discard the cloth and let the area air-dry. Do not put any substances on your body afterward--such as powder, lotion, or perfume--unless you are told to do so by your health care provider. Only use lotions that are recommended by the .  Put on clean clothes or pajamas.  Contact a health care provider if:  Your skin gets irritated after scrubbing.  You have questions about using your solution or cloth.  You swallow any chlorhexidine. Call your local poison control center (1-810.202.6609 in the [place]  Get help right away if:  Your eyes itch badly, or they become very red or swollen.  Your skin itches badly and is red or swollen.  Your hearing changes.  You have trouble seeing.  You have swelling or tingling in your mouth or throat.  You have trouble breathing.  These symptoms may represent a serious problem that is an emergency. Do not wait to see if the symptoms will go away. Get medical help right away. Call your local emergency services (364 in the [place] Do not drive yourself to the hospital.  Summary  Chlorhexidine  gluconate (CHG) is a germ-killing (antiseptic) solution that is used to clean the skin. Cleaning your skin with CHG may help to lower your risk for infection.  You may be given CHG to use for bathing. It may be in a bottle or in a prepackaged cloth to use on your skin. Carefully follow your health care provider's instructions and the instructions on the product label.  Do not use CHG if you have a chlorhexidine allergy.  Contact your health care provider if your skin gets irritated after scrubbing.  This information is not intended to replace advice given to you by your health care provider. Make sure you discuss any questions you have with your health care provider.  Document Revised: 04/17/2023 Document Reviewed: 02/28/2022  [employer]vier Patient Education © 2023 [employer]

## 2024-01-12 LAB
MRSA SPEC QL CULT: NORMAL
QT INTERVAL: 466 MS
QTC INTERVAL: 466 MS

## 2024-01-16 ENCOUNTER — ANESTHESIA (OUTPATIENT)
Dept: PERIOP | Facility: HOSPITAL | Age: 64
End: 2024-01-16
Payer: COMMERCIAL

## 2024-01-16 ENCOUNTER — ANESTHESIA EVENT (OUTPATIENT)
Dept: PERIOP | Facility: HOSPITAL | Age: 64
End: 2024-01-16
Payer: COMMERCIAL

## 2024-01-16 ENCOUNTER — APPOINTMENT (OUTPATIENT)
Dept: GENERAL RADIOLOGY | Facility: HOSPITAL | Age: 64
End: 2024-01-16
Payer: COMMERCIAL

## 2024-01-16 ENCOUNTER — HOSPITAL ENCOUNTER (OUTPATIENT)
Facility: HOSPITAL | Age: 64
Setting detail: SURGERY ADMIT
Discharge: HOME | End: 2024-01-16
Attending: NEUROLOGICAL SURGERY | Admitting: NEUROLOGICAL SURGERY
Payer: COMMERCIAL

## 2024-01-16 VITALS
HEIGHT: 78 IN | OXYGEN SATURATION: 97 % | BODY MASS INDEX: 34.13 KG/M2 | HEART RATE: 53 BPM | SYSTOLIC BLOOD PRESSURE: 172 MMHG | DIASTOLIC BLOOD PRESSURE: 91 MMHG | TEMPERATURE: 97.5 F | WEIGHT: 294.98 LBS | RESPIRATION RATE: 16 BRPM

## 2024-01-16 DIAGNOSIS — M48.062 SPINAL STENOSIS, LUMBAR REGION, WITH NEUROGENIC CLAUDICATION: ICD-10-CM

## 2024-01-16 DIAGNOSIS — Z98.890 STATUS POST LUMBAR LAMINECTOMY: Primary | ICD-10-CM

## 2024-01-16 DIAGNOSIS — M48.062 LUMBAR STENOSIS WITH NEUROGENIC CLAUDICATION: ICD-10-CM

## 2024-01-16 LAB
ABO GROUP BLD: NORMAL
BLD GP AB SCN SERPL QL: NEGATIVE
GLUCOSE BLDC GLUCOMTR-MCNC: 217 MG/DL (ref 70–130)
GLUCOSE BLDC GLUCOMTR-MCNC: 218 MG/DL (ref 70–130)
RH BLD: NEGATIVE
T&S EXPIRATION DATE: NORMAL

## 2024-01-16 PROCEDURE — 25010000002 MIDAZOLAM PER 1 MG: Performed by: NURSE ANESTHETIST, CERTIFIED REGISTERED

## 2024-01-16 PROCEDURE — 86901 BLOOD TYPING SEROLOGIC RH(D): CPT | Performed by: NEUROLOGICAL SURGERY

## 2024-01-16 PROCEDURE — 25010000002 PROPOFOL 10 MG/ML EMULSION: Performed by: NURSE ANESTHETIST, CERTIFIED REGISTERED

## 2024-01-16 PROCEDURE — 86850 RBC ANTIBODY SCREEN: CPT | Performed by: NEUROLOGICAL SURGERY

## 2024-01-16 PROCEDURE — 82948 REAGENT STRIP/BLOOD GLUCOSE: CPT

## 2024-01-16 PROCEDURE — 25010000002 SUGAMMADEX 200 MG/2ML SOLUTION: Performed by: NURSE ANESTHETIST, CERTIFIED REGISTERED

## 2024-01-16 PROCEDURE — 25010000002 ONDANSETRON PER 1 MG: Performed by: NURSE ANESTHETIST, CERTIFIED REGISTERED

## 2024-01-16 PROCEDURE — 25010000002 DEXAMETHASONE PER 1 MG: Performed by: NURSE ANESTHETIST, CERTIFIED REGISTERED

## 2024-01-16 PROCEDURE — 25810000003 LACTATED RINGERS PER 1000 ML: Performed by: NEUROLOGICAL SURGERY

## 2024-01-16 PROCEDURE — 86900 BLOOD TYPING SEROLOGIC ABO: CPT | Performed by: NEUROLOGICAL SURGERY

## 2024-01-16 PROCEDURE — 25010000002 FENTANYL CITRATE (PF) 50 MCG/ML SOLUTION: Performed by: NURSE ANESTHETIST, CERTIFIED REGISTERED

## 2024-01-16 PROCEDURE — 25010000002 CEFAZOLIN PER 500 MG: Performed by: NEUROLOGICAL SURGERY

## 2024-01-16 DEVICE — KT HEMOST ABS SURGIFOAM PORCN 1GRAM: Type: IMPLANTABLE DEVICE | Site: SPINE LUMBAR | Status: FUNCTIONAL

## 2024-01-16 DEVICE — HEMOST ABS SURGIFOAM SZ100 8X12 10MM: Type: IMPLANTABLE DEVICE | Site: SPINE LUMBAR | Status: FUNCTIONAL

## 2024-01-16 RX ORDER — SODIUM CHLORIDE 9 MG/ML
40 INJECTION, SOLUTION INTRAVENOUS AS NEEDED
Status: DISCONTINUED | OUTPATIENT
Start: 2024-01-16 | End: 2024-01-16 | Stop reason: HOSPADM

## 2024-01-16 RX ORDER — LIDOCAINE HYDROCHLORIDE 20 MG/ML
INJECTION, SOLUTION EPIDURAL; INFILTRATION; INTRACAUDAL; PERINEURAL AS NEEDED
Status: DISCONTINUED | OUTPATIENT
Start: 2024-01-16 | End: 2024-01-16 | Stop reason: SURG

## 2024-01-16 RX ORDER — HYDROCODONE BITARTRATE AND ACETAMINOPHEN 5; 325 MG/1; MG/1
1 TABLET ORAL ONCE AS NEEDED
Status: DISCONTINUED | OUTPATIENT
Start: 2024-01-16 | End: 2024-01-16 | Stop reason: HOSPADM

## 2024-01-16 RX ORDER — CYCLOBENZAPRINE HCL 10 MG
10 TABLET ORAL 3 TIMES DAILY PRN
Qty: 30 TABLET | Refills: 0 | Status: SHIPPED | OUTPATIENT
Start: 2024-01-16

## 2024-01-16 RX ORDER — AMOXICILLIN 250 MG
2 CAPSULE ORAL DAILY
Qty: 60 TABLET | Refills: 0 | Status: SHIPPED | OUTPATIENT
Start: 2024-01-16

## 2024-01-16 RX ORDER — NALOXONE HYDROCHLORIDE 4 MG/.1ML
SPRAY NASAL
Qty: 2 EACH | Refills: 0 | Status: SHIPPED | OUTPATIENT
Start: 2024-01-16

## 2024-01-16 RX ORDER — BUPIVACAINE HYDROCHLORIDE AND EPINEPHRINE 2.5; 5 MG/ML; UG/ML
INJECTION, SOLUTION EPIDURAL; INFILTRATION; INTRACAUDAL; PERINEURAL AS NEEDED
Status: DISCONTINUED | OUTPATIENT
Start: 2024-01-16 | End: 2024-01-16 | Stop reason: HOSPADM

## 2024-01-16 RX ORDER — SODIUM CHLORIDE 0.9 % (FLUSH) 0.9 %
3 SYRINGE (ML) INJECTION EVERY 12 HOURS SCHEDULED
Status: DISCONTINUED | OUTPATIENT
Start: 2024-01-16 | End: 2024-01-16 | Stop reason: HOSPADM

## 2024-01-16 RX ORDER — BUPIVACAINE HCL/0.9 % NACL/PF 0.125 %
PLASTIC BAG, INJECTION (ML) EPIDURAL AS NEEDED
Status: DISCONTINUED | OUTPATIENT
Start: 2024-01-16 | End: 2024-01-16 | Stop reason: SURG

## 2024-01-16 RX ORDER — DEXAMETHASONE SODIUM PHOSPHATE 4 MG/ML
INJECTION, SOLUTION INTRA-ARTICULAR; INTRALESIONAL; INTRAMUSCULAR; INTRAVENOUS; SOFT TISSUE AS NEEDED
Status: DISCONTINUED | OUTPATIENT
Start: 2024-01-16 | End: 2024-01-16 | Stop reason: SURG

## 2024-01-16 RX ORDER — ACETAMINOPHEN 500 MG
1000 TABLET ORAL ONCE
Status: COMPLETED | OUTPATIENT
Start: 2024-01-16 | End: 2024-01-16

## 2024-01-16 RX ORDER — DROPERIDOL 2.5 MG/ML
0.62 INJECTION, SOLUTION INTRAMUSCULAR; INTRAVENOUS ONCE AS NEEDED
Status: DISCONTINUED | OUTPATIENT
Start: 2024-01-16 | End: 2024-01-16 | Stop reason: HOSPADM

## 2024-01-16 RX ORDER — MAGNESIUM HYDROXIDE 1200 MG/15ML
LIQUID ORAL AS NEEDED
Status: DISCONTINUED | OUTPATIENT
Start: 2024-01-16 | End: 2024-01-16 | Stop reason: HOSPADM

## 2024-01-16 RX ORDER — FLUMAZENIL 0.1 MG/ML
0.2 INJECTION INTRAVENOUS AS NEEDED
Status: DISCONTINUED | OUTPATIENT
Start: 2024-01-16 | End: 2024-01-16 | Stop reason: HOSPADM

## 2024-01-16 RX ORDER — HYDROCODONE BITARTRATE AND ACETAMINOPHEN 10; 325 MG/1; MG/1
1 TABLET ORAL EVERY 4 HOURS PRN
Status: DISCONTINUED | OUTPATIENT
Start: 2024-01-16 | End: 2024-01-16 | Stop reason: HOSPADM

## 2024-01-16 RX ORDER — LIDOCAINE HYDROCHLORIDE 10 MG/ML
0.5 INJECTION, SOLUTION EPIDURAL; INFILTRATION; INTRACAUDAL; PERINEURAL ONCE AS NEEDED
Status: DISCONTINUED | OUTPATIENT
Start: 2024-01-16 | End: 2024-01-16 | Stop reason: HOSPADM

## 2024-01-16 RX ORDER — SODIUM CHLORIDE, SODIUM LACTATE, POTASSIUM CHLORIDE, CALCIUM CHLORIDE 600; 310; 30; 20 MG/100ML; MG/100ML; MG/100ML; MG/100ML
100 INJECTION, SOLUTION INTRAVENOUS CONTINUOUS
Status: DISCONTINUED | OUTPATIENT
Start: 2024-01-16 | End: 2024-01-16 | Stop reason: HOSPADM

## 2024-01-16 RX ORDER — IBUPROFEN 600 MG/1
600 TABLET ORAL ONCE AS NEEDED
Status: DISCONTINUED | OUTPATIENT
Start: 2024-01-16 | End: 2024-01-16 | Stop reason: HOSPADM

## 2024-01-16 RX ORDER — HYDROCODONE BITARTRATE AND ACETAMINOPHEN 7.5; 325 MG/1; MG/1
1 TABLET ORAL EVERY 6 HOURS PRN
Qty: 24 TABLET | Refills: 0 | Status: SHIPPED | OUTPATIENT
Start: 2024-01-16 | End: 2024-01-22

## 2024-01-16 RX ORDER — FENTANYL CITRATE 50 UG/ML
INJECTION, SOLUTION INTRAMUSCULAR; INTRAVENOUS AS NEEDED
Status: DISCONTINUED | OUTPATIENT
Start: 2024-01-16 | End: 2024-01-16 | Stop reason: SURG

## 2024-01-16 RX ORDER — SODIUM CHLORIDE 0.9 % (FLUSH) 0.9 %
3-10 SYRINGE (ML) INJECTION AS NEEDED
Status: DISCONTINUED | OUTPATIENT
Start: 2024-01-16 | End: 2024-01-16 | Stop reason: HOSPADM

## 2024-01-16 RX ORDER — SODIUM CHLORIDE 0.9 % (FLUSH) 0.9 %
3 SYRINGE (ML) INJECTION AS NEEDED
Status: DISCONTINUED | OUTPATIENT
Start: 2024-01-16 | End: 2024-01-16 | Stop reason: HOSPADM

## 2024-01-16 RX ORDER — FENTANYL CITRATE 50 UG/ML
25 INJECTION, SOLUTION INTRAMUSCULAR; INTRAVENOUS
Status: DISCONTINUED | OUTPATIENT
Start: 2024-01-16 | End: 2024-01-16 | Stop reason: HOSPADM

## 2024-01-16 RX ORDER — ONDANSETRON 2 MG/ML
INJECTION INTRAMUSCULAR; INTRAVENOUS AS NEEDED
Status: DISCONTINUED | OUTPATIENT
Start: 2024-01-16 | End: 2024-01-16 | Stop reason: SURG

## 2024-01-16 RX ORDER — ROCURONIUM BROMIDE 10 MG/ML
INJECTION, SOLUTION INTRAVENOUS AS NEEDED
Status: DISCONTINUED | OUTPATIENT
Start: 2024-01-16 | End: 2024-01-16 | Stop reason: SURG

## 2024-01-16 RX ORDER — NALOXONE HCL 0.4 MG/ML
0.4 VIAL (ML) INJECTION AS NEEDED
Status: DISCONTINUED | OUTPATIENT
Start: 2024-01-16 | End: 2024-01-16 | Stop reason: HOSPADM

## 2024-01-16 RX ORDER — ONDANSETRON 2 MG/ML
4 INJECTION INTRAMUSCULAR; INTRAVENOUS ONCE AS NEEDED
Status: DISCONTINUED | OUTPATIENT
Start: 2024-01-16 | End: 2024-01-16 | Stop reason: HOSPADM

## 2024-01-16 RX ORDER — MIDAZOLAM HYDROCHLORIDE 1 MG/ML
1 INJECTION INTRAMUSCULAR; INTRAVENOUS
Status: DISCONTINUED | OUTPATIENT
Start: 2024-01-16 | End: 2024-01-16 | Stop reason: HOSPADM

## 2024-01-16 RX ORDER — SODIUM CHLORIDE, SODIUM LACTATE, POTASSIUM CHLORIDE, CALCIUM CHLORIDE 600; 310; 30; 20 MG/100ML; MG/100ML; MG/100ML; MG/100ML
1000 INJECTION, SOLUTION INTRAVENOUS CONTINUOUS
Status: DISCONTINUED | OUTPATIENT
Start: 2024-01-16 | End: 2024-01-16 | Stop reason: HOSPADM

## 2024-01-16 RX ORDER — LABETALOL HYDROCHLORIDE 5 MG/ML
5 INJECTION, SOLUTION INTRAVENOUS
Status: DISCONTINUED | OUTPATIENT
Start: 2024-01-16 | End: 2024-01-16 | Stop reason: HOSPADM

## 2024-01-16 RX ORDER — PROPOFOL 10 MG/ML
VIAL (ML) INTRAVENOUS AS NEEDED
Status: DISCONTINUED | OUTPATIENT
Start: 2024-01-16 | End: 2024-01-16 | Stop reason: SURG

## 2024-01-16 RX ADMIN — SODIUM CHLORIDE, POTASSIUM CHLORIDE, SODIUM LACTATE AND CALCIUM CHLORIDE 1000 ML: 600; 310; 30; 20 INJECTION, SOLUTION INTRAVENOUS at 12:19

## 2024-01-16 RX ADMIN — ROCURONIUM BROMIDE 20 MG: 10 INJECTION, SOLUTION INTRAVENOUS at 13:01

## 2024-01-16 RX ADMIN — MIDAZOLAM HYDROCHLORIDE 1 MG: 1 INJECTION, SOLUTION INTRAMUSCULAR; INTRAVENOUS at 12:46

## 2024-01-16 RX ADMIN — PROPOFOL INJECTABLE EMULSION 50 MG: 10 INJECTION, EMULSION INTRAVENOUS at 13:20

## 2024-01-16 RX ADMIN — SUGAMMADEX 200 MG: 100 INJECTION, SOLUTION INTRAVENOUS at 14:12

## 2024-01-16 RX ADMIN — PROPOFOL INJECTABLE EMULSION 200 MG: 10 INJECTION, EMULSION INTRAVENOUS at 13:01

## 2024-01-16 RX ADMIN — CEFAZOLIN 2 G: 2 INJECTION, POWDER, FOR SOLUTION INTRAMUSCULAR; INTRAVENOUS at 13:05

## 2024-01-16 RX ADMIN — SODIUM CHLORIDE, POTASSIUM CHLORIDE, SODIUM LACTATE AND CALCIUM CHLORIDE: 600; 310; 30; 20 INJECTION, SOLUTION INTRAVENOUS at 14:22

## 2024-01-16 RX ADMIN — ONDANSETRON 4 MG: 2 INJECTION INTRAMUSCULAR; INTRAVENOUS at 14:05

## 2024-01-16 RX ADMIN — Medication 200 MCG: at 13:55

## 2024-01-16 RX ADMIN — LIDOCAINE HYDROCHLORIDE 100 MG: 20 INJECTION, SOLUTION EPIDURAL; INFILTRATION; INTRACAUDAL; PERINEURAL at 14:15

## 2024-01-16 RX ADMIN — ROCURONIUM BROMIDE 20 MG: 10 INJECTION, SOLUTION INTRAVENOUS at 13:20

## 2024-01-16 RX ADMIN — LIDOCAINE HYDROCHLORIDE 100 MG: 20 INJECTION, SOLUTION EPIDURAL; INFILTRATION; INTRACAUDAL; PERINEURAL at 13:01

## 2024-01-16 RX ADMIN — FENTANYL CITRATE 50 MCG: 50 INJECTION, SOLUTION INTRAMUSCULAR; INTRAVENOUS at 14:12

## 2024-01-16 RX ADMIN — Medication 100 MCG: at 13:37

## 2024-01-16 RX ADMIN — FENTANYL CITRATE 50 MCG: 50 INJECTION, SOLUTION INTRAMUSCULAR; INTRAVENOUS at 13:01

## 2024-01-16 RX ADMIN — DEXAMETHASONE SODIUM PHOSPHATE 8 MG: 4 INJECTION, SOLUTION INTRA-ARTICULAR; INTRALESIONAL; INTRAMUSCULAR; INTRAVENOUS; SOFT TISSUE at 13:06

## 2024-01-16 RX ADMIN — ACETAMINOPHEN 1000 MG: 500 TABLET, FILM COATED ORAL at 12:46

## 2024-01-16 NOTE — ANESTHESIA POSTPROCEDURE EVALUATION
"Patient: Hermilo Mccord    Procedure Summary       Date: 01/16/24 Room / Location:  [facility]  /  [facility]    Anesthesia Start: 1256 Anesthesia Stop: 1423    Procedure: LUMBAR 4-5 LAMINECTOMY MINIMALLY INVASIVE, right (Right: Spine Lumbar) Diagnosis:       Spinal stenosis, lumbar region, with neurogenic claudication      (Spinal stenosis, lumbar region, with neurogenic claudication [M48.062])    Surgeons: Jhonatan Contreras MD Provider: Harinder Manzano CRNA    Anesthesia Type: general ASA Status: 2            Anesthesia Type: general    Vitals  Vitals Value Taken Time   /80 01/16/24 1500   Temp 97.5 °F (36.4 °C) 01/16/24 1500   Pulse 58 01/16/24 1501   Resp 18 01/16/24 1500   SpO2 92 % 01/16/24 1501   Vitals shown include unfiled device data.        Post Anesthesia Care and Evaluation    Patient location during evaluation: PACU  Patient participation: complete - patient participated  Level of consciousness: awake and alert  Pain management: adequate    Airway patency: patent  Anesthetic complications: No anesthetic complications  PONV Status: none  Cardiovascular status: acceptable and hemodynamically stable  Respiratory status: acceptable  Hydration status: acceptable    Comments: Blood pressure 172/91, pulse 53, temperature 97.5 °F (36.4 °C), temperature source Temporal, resp. rate 16, height 198 cm (77.95\"), weight 134 kg (294 lb 15.6 oz), SpO2 97%.    Patient discharged from PACU based upon Luis score. Please see RN notes for further details    "

## 2024-01-16 NOTE — DISCHARGE INSTRUCTIONS
[facility] Neurosurgery    Postoperative care following spine surgery  Dear Patient,  You have recently undergone spine surgery (minimally invasive right L4-5 laminectomy) and are now ready to go home. These written instructions are intended to help you to recover quickly.  If you have ANY QUESTIONS about your condition prior to discharge please ask Dr. Contreras. In particular, if you have concerns about going home discuss them now. We do not want you to go until you are completely comfortable leaving the hospital.   If you have ANY QUESTIONS about your condition after you go home call your doctor. The number is [phone] which is answered 24 hours a day. During regular working hours a  will connect you to your doctor, one of his partners, or one of our nurses. At night or on weekends the answering service will connect you with the physician on call. DO NOT HESITATE to call. We want to help you with any problems.     Deep vein thrombosis/ pulmonary embolus  Some patients who undergo surgery develop blood clots in the veins of the legs. These clots can cause pain or swelling in the legs, or may cause no obvious problem. They can break free from the legs and travel to the lungs causing shortness of breath and/or chest pain.you develop pain or swelling in your legs after surgery, call your doctor. If you develop breathing problems or chest pain after surgery, call 251.    Neurological Deficit  Neurological deficits are problems with brain function like speech difficulty, weakness, numbness, imbalance, etc. These deficits may be present before or after spine surgery. Prior to discharge your doctor will make sure that all treatment needed to help you recover from such deficits has been instituted. He will also make sure that these deficits are stable or improving. After you go home, if you think any of your spine problems are getting worse, not better, it may be a sign of bleeding, infection, or other  problems. Call your doctor. He will order tests and prescribe treatment as needed.    Activity Restrictions  In general after surgery you will be on restricted activities for several weeks to several months depending on the nature of your operation. For six weeks you should avoid heavy lifting (over 8 lbs or roughly a gallon of milk), bending, or stooping. You may be released by Dr. Contreras earlier. You may return to driving when you feel comfortable enough that you can safely handle your vehicle AND you are not taking narcotic pain medications. This may be as early as 10 - 14 days, but could be longer as instructed by your neurosurgeon. After surgery you may gradually increase your activities, as you are able to tolerate. Walking is an excellent low impact exercise to begin after surgery. You should try to make regular walks of 15 to 30 minutes part of your postoperative recovery as you become able to do so. It typically requires a period of days to a few weeks to reach this level of activity and should be done in a gradual fashion. Your return to work will depend on your job requirements. In general, you may return to light duty work as soon as you feel comfortable and are not taking routine narcotic pain medications.    Medication  It is important to take your medication EXACTLY as prescribed. Some patients are reluctant to take pain medication. It is perfectly fine to take pain medication for several weeks after surgery. We want to eliminate pain whenever possible. Many pain medications can cause nausea (sick to your stomach), constipation (inability to poop), or itching. Nausea may be minimized by taking the medication with food. Constipation can be relieved by taking stool softeners and/ or laxatives that you can purchase over the counter as needed.    It is important to realize that no pain after surgery is an unrealistic expectation.  Pain medication will never reduce your pain score to zero.  The goal of  pain medicine is to reduce your pain to the point you can move, take care of yourself, and participate in therapy.  Make sure to work with your caregiver to determine what is an adequate level of pain control to promote healthy movement and then take your medication to reach this goal.      You have undergone a Minimally invasive laminectomy (2) surgery which you are expected to recover from over several weeks.     Use of opioids immediately following surgery is often necessary.  Pain after surgery results in decreased quality of life, surgical complications, and prolonged rehabilitation.  Thus in certain situations, the benefits of a limited course of opioids may outweigh the risk.      However, multiple studies have found that patients of all ages frequently take fewer opioid pills than the amount prescribed after common surgeries.  In some cases they do not take any of the prescribed medications at all.  This results in excess opioid pills that are accessible to others, raise a concern for misuse, can be stolen by family members, can result in later addiction, or can often be used in overdose.  Therefore we are going to make every effort to only prescribe as much pain medication as necessary to limit the amount of pills that are left over after you recover.      First-line treatment should include nonopioid analgesics.  Examples of these would be Tylenol or nonsteroidal anti-inflammatories such as ibuprofen or Aleve.  - Tylenol 1000 mg every six hours as needed    Second-line treatment. If the above first-line treatments are insufficient to control your pain you have also been provided a small dose of opioids.  In order to avoid addiction you should take the lowest amount possible.    Type II: Opioid prescription for 7 days or less (every 6 hours).  May consider an additional 7-day course (every 8 hours)    EXAMPLE IF APPLICABLE:  Please taper your pain medications to avoid long term addiction.  Week 1: Take your  pain medication no more than ever 6 hours as needed for severe pain.  Week 2: Take your pain medication no more than every 8 hours as needed for severe pain.  Week 3: Take your pain medication no more than every 12 hours as needed for unresolved pain.    These recommendations are based on ...  CDC guidelines for control and prevention of postsurgical pain,   [place] opioid prescribing engagement network (OPEN),   Yumiko morales in [facility] medical directors group prescribing opioids for postoperative pain-supplemental guidance (2018)    Wound Care  Your incision is held together with dissolvable sutures that do not need to be removed.     Seventy-two hours after surgery it is OK to get the wound wet, so you can take a shower or bath.     You do not need to put any medication (like Neosporin or Vitamin E) on the wound. Scrubbing the wound should be avoided until the staples nondissolvable sutures come out.     Heating pads have the potential to cause very serious burns, especially in patients using narcotic pain medications (e.g. Oxycodone, Oxycontin, etc.) Do not use heating pads during your recovery.      No nicotine products, including second-hand smoke, gum or patches. Nicotine will delay healing and increase the likelihood of a surgical complication. For help quitting, call the Quitline: 1-524.762.3132     Potential wound problems include the following:  Infection--If the wound becomes red, tender, swollen, or warm it may be infected. Infection is often accompanied by fever. If you think your wound might be infected you should call your doctor. Often you can send us a picture of the wound so we can better evaluate it.   Drainage--Fluid should not drain from your wound. If it does, call your doctor. Colored fluid may indicate infection. Clear fluid may indicate leakage of spinal fluid.   Dehiscence--If the wound does not heal properly it may open up along the staple line. This is called  dehiscence. Call us immediately.   Sutures--Occasionally, one of the buried threads (sutures) may work through the skin. If you think this has happened call your doctor.   Swelling--Spinal fluid or blood may collect under the skin. This is usually harmless, but needs to be evaluated. Call your doctor.     How to contact your doctor  Dr. Contreras and his team did your surgery and, therefore, are likely to know more about your condition than any other physicians. We are immediately available to help you with any problems after surgery. Please call us for any concerns at the following numbers:  Doctor’s office:  [phone] (answered 24 hours a day)   [facility] : [phone] (alternative emergency number for on-call neurosurgeon)     Specific instructions related to your surgery  Diet: no restrictions, eat a heart healthy diet. If you are diabetic, tightly controlling your blood sugar over the next 2 weeks is crucial in controlling infection.     Activity: as tolerated, no heavy lifting greater than 8 pounds (1 gallon of milk) or strenuous exercise for at least 2 weeks.    Brace/collar instructions: If you have been provided with the brace while in the hospital, please wear as instructed.    Please do not use NSAIDs (Ibuprofen, Toradol, etc) for 4 weeks following spinal fusion, as this can prevent bone growth. Tylenol is acceptable as an over the counter pain management.     Follow up:   Follow up with Gildardo SRIVASTAVA in 2 weeks for post op wound check and with Dr. Contreras in the neurosurgery clinic ([phone]) in 6 weeks.  We will scheduled these appointment(s) for you.            Sincerely,        Dereck Contreras MD, PhD, MPH

## 2024-01-16 NOTE — ANESTHESIA PREPROCEDURE EVALUATION
Anesthesia Evaluation     Patient summary reviewed   no history of anesthetic complications:   NPO Solid Status: > 8 hours  NPO Liquid Status: > 8 hours           Airway   Mallampati: I  TM distance: >3 FB  Neck ROM: full  No difficulty expected  Dental - normal exam     Pulmonary - negative pulmonary ROS and normal exam   Cardiovascular - normal exam  Exercise tolerance: good (4-7 METS)    (+) hypertension well controlled less than 2 medications, past MI  >12 months, hyperlipidemia      Neuro/Psych  (+) numbness  GI/Hepatic/Renal/Endo    (+) obesity, GERD well controlled, renal disease- stones, diabetes mellitus type 2 poorly controlled    Musculoskeletal (-) negative ROS    Abdominal  - normal exam   Substance History   (+) alcohol use     OB/GYN          Other - negative ROS                   Anesthesia Plan    ASA 2     general     intravenous induction     Anesthetic plan, risks, benefits, and alternatives have been provided, discussed and informed consent has been obtained with: patient.    CODE STATUS:

## 2024-01-16 NOTE — OP NOTE
NEUROSURGERY OPERATIVE NOTE    Hermilo Mccord  OR Date: 1/16/2024    Pre-op Diagnosis:   Spinal stenosis, lumbar region, with neurogenic claudication [M48.062]    Post-op Diagnosis:     Post-Op Diagnosis Codes:     * Spinal stenosis, lumbar region, with neurogenic claudication [M48.062]          Surgeon(s):  Jhonatan Contreras MD    Anesthesia: General    Staff:   Circulator: Fabricio Mathis RN  Scrub Person: Gloria Bennett CST; Estrella Young    Procedure(s):  LUMBAR 4-5 LAMINECTOMY MINIMALLY INVASIVE, right    Decompression -  - Lumbar Hemilaminectomy with medial facetectomy and foraminotomy, minimally invasive, right at L4/5    INDICATIONS FOR PROCEDURE:   Hermilo Mccord is a 63 y.o. male with a significant medical history of hypertension, hyperlipidemia, GERD, diabetes, and obesity.  He presents with a known problem of lumbar back and bilateral leg pain in the l4 distribution, right > left, 50% legs, 50% back. CHOCO: 36,50.  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  His imaging shows no evidence of acute fractures, anteriolisthesis of L4 over L5 that appears relatively unchanged in the upright versus supine position, and moderate multilevel degenerative disc disease.  MRI of the lumbar spine with severe stenosis at L4/5     The risks and benefits of the procedure were discussed. The patient accepted and understood all potential risks and complications including infection, CSF leak, hematoma, damage to nerve roots or spinal cord, bowel or bladder incontinence, difficulty walking or weakness.  After considering options, the patient requested that we proceed with the procedure.    DESCRIPTION OF OPERATION:   On the day of surgery, the patient was brought to the preoperative holding area where IV access was obtained. Prophylactic intravenous antibiotics were administered. The patient was  then brought to the major operative suite.     While on the [facility], the patient underwent an uneventful induction of general anesthetic with placement of endotracheal tube. TEDs and SCD hoses were applied.  The patient was then placed in prone position on a William table.  All pressure points were inspected and appropriately padded, and the patient was secured to the table. His back was sterilely prepped with alcohol.  ChloraPrep was then applied and allowed to dry for 3 minutes, and the patient was draped in the usual fashion.  At this point a WHO surgical timeout was performed with all members of the surgical team.      With the patient's relevant imaging dominantly displayed, lateral fluoroscopy was brought into the field and an 18-gauge spinal needle was inserted 1 cm right of midline and used to localize the L4/5 disc space.  The skin was infused with 0.25% Marcaine with epinephrine.  A #10 blade was used to incise the skin and the initial dilator was placed and docked against the lamina spinous process junction.  At this point a lateral image was obtained to ensure correct level, and then swung into the AP position to ensure that we had not crossed midline.    Once confirmed serial dilation was performed.  A final tube size 60x20 mm was placed and the internal dilators were removed.  Microscope was then brought into the field and the small amount of remaining paraspinal muscles were removed with Bovie cautery thus exposing the lamina.  Under magnification and using a 3 mm side cutting rinku the lamina and medial aspect of the facet at right L4/5 was removed exposing the ligamentum flavum. Of note there was a large right synovial cyst incorporated into the dura.  This was delicately  from the dura without complication.      The tube was then redirected to the contralateral side, superiorly, and medially and the underside of the lamina on both superior and inferior aspect were fully removed  exposing the ligamentum flavum to the contralateral side and contralateral foramen.  Next a 2 and 3 mm Kerrison was used to widen the exposure.  The bone edges were then waxed.  A straight curette was used to puncture the ligamentum flavum and expose the dura.  A 2 mm Kerrison was then used to remove the ligamentum flavum.  A 90 degree Kerrisons were then used to remove the ligamentum flavum and the medial facet on the contralateral side.  This was done until both the ipsilateral, midline, and contralateral dura were fully decompressed.  Next attention was turned to the foramen bilaterally.  A 40 degree 2 mm Kerrison was used to decompress the ipsilateral foramen and a 90 degree 2 mm Kerrison was used to decompress the contralateral foramen until a nerve hook could be stuck easily out the foramen. The wound was then copiously irrigated.  Meticulous hemostasis was again achieved.  A small piece of Gelfoam was placed overlying the nerve root and dura.  The dura was inspected and found to be intact.  The tubular retractor was then slowly removed taking care to address hemostasis in the muscles fascia and subcutaneous tissues on the way out.  A 0 Vicryl on a UR6 needle was used to close the fascia.      The subcutaneous tissues were closed with 2-0 Vicryl.  A 4-0 Monocryl was used to close the skin.  The patient was awoken and found to be at his neurological baseline.  He was successfully extubated and transferred to the postoperative care unit in stable condition.      Estimated Blood Loss: minimal    Complications: None    Implants:   Implant Name Type Inv. Item Serial No.  Lot No. LRB No. Used Action   HEMOST ABS SURGIFOAM  8X12 10MM - [id number] Implant HEMOST ABS SURGIFOAM  8X12 10MM  [employer]  [employer] [id number] Right 1 Implanted   KT HEMOST ABS SURGIFOAM PORCN 1GRAM - [id number] Implant KT HEMOST ABS SURGIFOAM PORCN 1GRAM  [employer]  [employer] J [id number] Right 1 Implanted       Specimens:                 None      Drains:   Ureteral Drain/Stent Left ureter 168 Fr. (Active)       Ureteral Drain/Stent Left ureter 6 Fr. (Active)

## 2024-01-16 NOTE — ANESTHESIA PROCEDURE NOTES
Airway  Date/Time: 1/16/2024 12:53 PM    General Information and Staff    Patient location during procedure: OR    Indications and Patient Condition  Indications for airway management: airway protection    Preoxygenated: yes  Mask difficulty assessment: 0 - not attempted    Final Airway Details  Final airway type: endotracheal airway      Successful airway: ETT  Cuffed: yes   Successful intubation technique: direct laryngoscopy  Endotracheal tube insertion site: oral  Blade: Freeman  Blade size: 2  ETT size (mm): 7.5  Cormack-Lehane Classification: grade I - full view of glottis  Placement verified by: chest auscultation   Measured from: lips  ETT/EBT  to lips (cm): 23  Number of attempts at approach: 1  Assessment: lips, teeth, and gum same as pre-op and atraumatic intubation

## 2024-01-16 NOTE — DISCHARGE SUMMARY
DISCHARGE SUMMARY FROM HOSPITAL    Date of Discharge:  1/16/2024    Presenting Diagnosis/History of Present Illness  Spinal stenosis, lumbar region, with neurogenic claudication [M48.062]    Medical History   Patient Active Problem List   Diagnosis    Ureterolithiasis    Left ureteral stone    Acute kidney injury    Acute left flank pain    Hydronephrosis    Lumbar stenosis with neurogenic claudication    Spinal stenosis, lumbar region, with neurogenic claudication     Discharge Diagnosis/ Active Hospital Problems:   Active Hospital Problems    Diagnosis     **Spinal stenosis, lumbar region, with neurogenic claudication      Hospital Course  Patient is a 63 y.o. male presented with a significant medical history of hypertension, hyperlipidemia, GERD, diabetes, and obesity.  He presents with a known problem of lumbar back and bilateral leg pain in the l4 distribution, right > left, 50% legs, 50% back. CHOCO: 36,50.  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  His imaging shows no evidence of acute fractures, anteriolisthesis of L4 over L5 that appears relatively unchanged in the upright versus supine position, and moderate multilevel degenerative disc disease.  MRI of the lumbar spine with severe stenosis at L4/5.      On 1/16/2024 the patient was brought to the main operating room where he underwent an uneventful right MIS L4-5 laminectomy.  Postoperatively  he did extremely well and his neurological exam was unchanged.  has been able to tolerate oral diet, oral pain medications, and void.  He has been monitored postoperatively per hospital policy and deemed safe for discharge.  Postoperative education was performed at bedside which included wound care instructions, maximal physical activity, use of postoperative pain medication including tapering, and indications for contacting the neurosurgical  office vs the emergency department.  Arrangements for postoperative follow-up should be provided prior to hospital discharge.  He will be provided with an appropriate course of oral medication for pain control.  Additional information provided in hospital discharge instructions.    Procedures Performed  Procedure(s):  LUMBAR 4-5 LAMINECTOMY MINIMALLY INVASIVE, right       Consults:   Consults       No orders found from 12/18/2023 to 1/17/2024.            Pertinent Test Results:   XR Chest 1 View    Result Date: 1/11/2024  No acute findings.  This report was signed and finalized on 1/11/2024 11:50 AM by Dr. Km Noble MD.      MRI Cervical Spine Without Contrast    Result Date: 1/8/2024  Multilevel cervical spondylosis, no disc herniation is identified. There is no significant spinal canal stenosis and the spinal cord appears unremarkable. There is severe left-sided neural foraminal narrowing C3-4, severe right-sided neural foraminal narrowing C6-7 and moderate to severe right-sided neural femoral narrowing C3-4, related to uncinate spurring and facet overgrowth.  This report was signed and finalized on 1/8/2024 9:17 PM by Dr. Morgan Juarez MD.      MRI Lumbar Spine Without Contrast    Result Date: 1/3/2024  1. Lumbar spondylosis. Loss and mild reversal of lumbar lordosis. Mild anterolisthesis of L4 over L5. 2. Multilevel prominent disc osteophyte complexes, facet arthropathy, significantly large hypertrophic spurs from the facet joints and ligamental hypertrophy and resultant neural foraminal spinal canal stenosis, most severely at L4-5 as detailed above.   This report was signed and finalized on 1/3/2024 10:46 AM by Dr. Pat Hays MD.       CBC:   Results from last 7 days   Lab Units 01/11/24  1136   WBC 10*3/mm3 6.99   HEMOGLOBIN g/dL 14.0   HEMATOCRIT % 43.5   PLATELETS 10*3/mm3 194     BMP:  Results from last 7 days   Lab Units 01/11/24  1136   SODIUM mmol/L 137   POTASSIUM mmol/L 3.7   CHLORIDE  mmol/L 104   CO2 mmol/L 21.0*   BUN mg/dL 20   CREATININE mg/dL 1.19   GLUCOSE mg/dL 329*   CALCIUM mg/dL 9.0   ALT (SGPT) U/L 29     Culture Results:   MRSA Screen Cx   Date Value Ref Range Status   01/11/2024   Final    No Methicillin Resistant Staphylococcus aureus isolated       Condition on Discharge:  Stable    Vital Signs  Temp:  [97.3 °F (36.3 °C)-97.7 °F (36.5 °C)] 97.5 °F (36.4 °C)  Heart Rate:  [53-74] 53  Resp:  [16-24] 16  BP: ()/(59-91) 172/91    Physical Exam:   Physical Exam  Vitals and nursing note reviewed.   Constitutional:       General: He is not in acute distress.     Appearance: Normal appearance. He is well-developed and well-groomed. He is obese. He is not ill-appearing, toxic-appearing or diaphoretic.          Comments: BMI 34.13   HENT:      Head: Normocephalic and atraumatic.      Right Ear: Hearing normal.      Left Ear: Hearing normal.   Eyes:      Extraocular Movements: EOM normal.      Conjunctiva/sclera: Conjunctivae normal.      Pupils: Pupils are equal, round, and reactive to light.   Neck:      Trachea: Trachea normal.   Cardiovascular:      Rate and Rhythm: Normal rate and regular rhythm.   Pulmonary:      Effort: Pulmonary effort is normal. No tachypnea, bradypnea, accessory muscle usage or respiratory distress.   Abdominal:      Palpations: Abdomen is soft.   Musculoskeletal:      Cervical back: Full passive range of motion without pain and neck supple.   Skin:     General: Skin is warm and dry.   Neurological:      Mental Status: He is alert and oriented to person, place, and time.      GCS: GCS eye subscore is 4. GCS verbal subscore is 5. GCS motor subscore is 6.   Psychiatric:         Speech: Speech normal.         Behavior: Behavior normal. Behavior is cooperative.          Neurologic Exam     Mental Status   Oriented to person, place, and time.   Attention: normal. Concentration: normal.   Speech: speech is normal   Level of consciousness: alert    Cranial Nerves      CN II   Visual fields full to confrontation.     CN III, IV, VI   Pupils are equal, round, and reactive to light.  Extraocular motions are normal.     CN V   Facial sensation intact.     CN VII   Facial expression full, symmetric.     CN VIII   CN VIII normal.     CN IX, X   CN IX normal.     CN XI   CN XI normal.     Motor Exam   Right arm tone: normal  Left arm tone: normal  Right leg tone: normal  Left leg tone: normal    Strength   Right deltoid: 5/5  Left deltoid: 5/5  Right biceps: 5/5  Left biceps: 5/5  Right triceps: 5/5  Left triceps: 5/5  Right wrist extension: 5/5  Left wrist extension: 5/5  Right iliopsoas: 5/5  Left iliopsoas: 5/5  Right quadriceps: 5/5  Left quadriceps: 5/5  Right anterior tibial: 5/5  Left anterior tibial: 5/5  Right gastroc: 5/5  Left gastroc: 5/5  Right EHL 5/5  Left EHL 5/5       Sensory Exam   Right arm light touch: normal  Left arm light touch: normal  Right leg light touch: normal  Left leg light touch: normal    Gait, Coordination, and Reflexes     Tremor   Resting tremor: absent  Intention tremor: absent  Action tremor: absent    Discharge Disposition  Home or Self Care    Discharge Medications     Discharge Medications        New Medications        Instructions Start Date   cyclobenzaprine 10 MG tablet  Commonly known as: FLEXERIL   10 mg, Oral, 3 Times Daily PRN      HYDROcodone-acetaminophen 7.5-325 MG per tablet  Commonly known as: NORCO  Replaces: HYDROcodone-acetaminophen  MG per tablet   1 tablet, Oral, Every 6 Hours PRN, 6-day increase in pain medication for postoperative pain then may resume previously prescribed narcotic      naloxone 4 MG/0.1ML nasal spray  Commonly known as: NARCAN   Call 911. Don't prime. [place] in 1 nostril for overdose. Repeat in 2-3 minutes in other nostril if no or minimal breathing/responsiveness.      sennosides-docusate 8.6-50 MG per tablet  Commonly known as: PERICOLACE   2 tablets, Oral, Daily             Continue These  Medications        Instructions Start Date   aspirin 81 MG chewable tablet   81 mg, Oral, Daily      desloratadine 5 MG tablet  Commonly known as: CLARINEX   5 mg, Oral, Daily      Entresto 49-51 MG tablet  Generic drug: sacubitril-valsartan   1 tablet, Oral, 2 Times Daily      esomeprazole 40 MG capsule  Commonly known as: nexIUM   40 mg, Oral, Every Morning Before Breakfast      furosemide 40 MG tablet  Commonly known as: LASIX   1 tablet, Oral, Daily      glimepiride 2 MG tablet  Commonly known as: AMARYL   2 mg, Oral, Every Morning Before Breakfast      Jardiance 25 MG tablet tablet  Generic drug: empagliflozin   25 mg, Oral, Daily      labetalol 100 MG tablet  Commonly known as: NORMODYNE   2 tablets, Oral, Every 12 Hours Scheduled      simvastatin 40 MG tablet  Commonly known as: ZOCOR   40 mg, Oral, Nightly             Stop These Medications      HYDROcodone-acetaminophen  MG per tablet  Commonly known as: NORCO  Replaced by: HYDROcodone-acetaminophen 7.5-325 MG per tablet            Discharge Diet:   Diet Instructions       Advance Diet As Tolerated -Target Diet: Regular diet.  Advance as tolerated      Target Diet: Regular diet.  Advance as tolerated           Activity at Discharge:   Activity Instructions       Activity as Tolerated      Bathing Restrictions      May shower 72 hours postoperatively.  No tub baths.  Showers only.  Allow clean soapy water to cleanse wound.  Do not scrub incision.    Type of Restriction: Bathing    Bathing Restrictions: No Tub Bath    Driving Restrictions      Type of Restriction: Driving    Driving Restrictions: No Driving While Taking Narcotics    Gradually Increase Activity Until at Pre-Hospitalization Level      Lifting Restrictions      Type of Restriction: Lifting    Lifting Restrictions: Lifting Restriction (Indicate Limit)    Weight Limit (Pounds): 8    Length of Lifting Restriction: 2-3 WEEKS           Follow-up Appointments  Future Appointments   Date Time  Provider Department Center   1/30/2024  2:45 PM Gildardo Danielle APRN [facility]   1/31/2024  9:00 AM  [facility] (OP) NEURO RM 2 (DIAL) [facility]   2/28/2024 10:15 AM Jhonatan Contreras MD [facility]     Additional Instructions for the Follow-ups that You Need to Schedule       Call MD With Problems / Concerns   As directed      Instructions: Call for worsening pain or a concern for a postoperative incision infection (increased incisional redness, swelling, warmth, or drainage/discharge).    Order Comments: Instructions: Call for worsening pain or a concern for a postoperative incision infection (increased incisional redness, swelling, warmth, or drainage/discharge).         Discharge Follow-up with Specified Provider: Dr. Contreras; 6 Weeks   As directed      To: Dr. Contreras   Follow Up: 6 Weeks   Follow Up Details: 6-8 WEEKS        Discharge Follow-up with Specified Provider: CAROLA Trujillo; 2 Weeks   As directed      To: CAROLA Trujillo   Follow Up: 2 Weeks   Follow Up Details: Postop wound check              Test Results Pending at Discharge  None     CAROLA Ramos  01/16/24  15:58 CST    [id number]

## 2024-01-30 ENCOUNTER — TELEPHONE (OUTPATIENT)
Dept: NEUROLOGY | Facility: HOSPITAL | Age: 64
End: 2024-01-30

## 2024-01-30 ENCOUNTER — OFFICE VISIT (OUTPATIENT)
Dept: NEUROSURGERY | Facility: CLINIC | Age: 64
End: 2024-01-30
Payer: COMMERCIAL

## 2024-01-30 VITALS — HEIGHT: 78 IN | WEIGHT: 294 LBS | BODY MASS INDEX: 34.02 KG/M2

## 2024-01-30 DIAGNOSIS — M48.062 SPINAL STENOSIS, LUMBAR REGION, WITH NEUROGENIC CLAUDICATION: ICD-10-CM

## 2024-01-30 DIAGNOSIS — Z98.890 S/P LUMBAR LAMINECTOMY: Primary | ICD-10-CM

## 2024-01-30 DIAGNOSIS — E66.09 CLASS 1 OBESITY DUE TO EXCESS CALORIES WITH SERIOUS COMORBIDITY AND BODY MASS INDEX (BMI) OF 34.0 TO 34.9 IN ADULT: ICD-10-CM

## 2024-01-30 PROCEDURE — 99024 POSTOP FOLLOW-UP VISIT: CPT | Performed by: NURSE PRACTITIONER

## 2024-01-30 NOTE — PROGRESS NOTES
"Chief complaint:   Chief Complaint   Patient presents with    Post-op     Pt is here for 2 wk PO visit.        Subjective     HPI:   Interval History: Hermilo Mccord is a 63 y.o.  male who presents today for post operative follow-up from a LUMBAR 4-5 LAMINECTOMY MINIMALLY INVASIVE, right - Right on 1/16/2024.  Mr. Mccord has done extremely well since we last saw him.  He currently complains of some lower back \"tightness\" but denies lumbar back pain or lower extremity radicular pain, weakness, numbness, or tingling.  He is slowly returning to normal daily activities.  He denies gait or balance abnormalities or falls.  He additionally denies fevers, chills, a concern for postoperative incision infection, saddle anesthesia, or bowel or bladder dysfunction.  He currently rates the severity of his symptoms 0/10.  No additional concerns    [facility]  Would you say your health is: 3 (1/11/2024 12:38 PM)  How would you rate your quality of life?: 4 (1/11/2024 12:38 PM)  How would you rate your physical health?: 2 (1/11/2024 12:38 PM)  How would you rate your mental health, including your mood and your ability to think?: 5 (1/11/2024 12:38 PM)  How would you rate your satisfaction with your social activities and relationships?: 5 (1/11/2024 12:38 PM)  Please rate how well you carry out your usual social activities and roles (i.e. activities at home, work and in your community, and responsibilities as a parent, child, spouse, employee, friend): 5 (1/11/2024 12:38 PM)  To what extent are you able to carry out your everyday physical activities such as walking, climbing stairs or carrying groceries?: 2 (1/11/2024 12:38 PM)  In the past 7 DAYS, how often have you been bothered by emotional problems such as feeling anxious, depressed or irritable?: 4 (1/11/2024 12:38 PM)  In the past 7 DAYS, how would you rate your fatigue on average?: 3 (1/11/2024 12:38 PM)  How would you rate your pain on average? " (0-10): 8 (1/11/2024 12:38 PM)  PROMIS Global Physical Health T Score: 47.7 (1/11/2024 12:38 PM)  PROMIS Global Physical Health Raw Score: 15 (1/11/2024 12:38 PM)  PROMIS Global Mental Health T Score: 59 (1/11/2024 12:38 PM)  PROMIS Global Mental Health Raw Score: 18 (1/11/2024 12:38 PM)    PROMIS Global Health Physical and Mental Health Scores  PROMIS Global Physical Health Raw Score: 15 (1/11/2024 12:38 PM)  PROMIS Global Physical Health T Score: 47.7 (1/11/2024 12:38 PM)  PROMIS Global Mental Health Raw Score: 18 (1/11/2024 12:38 PM)  PROMIS Global Mental Health T Score: 59 (1/11/2024 12:38 PM)      No data recorded  PROMIS Physical Function  Are you able to do chores such as vacuuming or yard work?: 1 (1/11/2024 12:38 PM)  Are you able to go up and down stairs at a normal pace?: 2 (1/11/2024 12:38 PM)  Are you able to go for a walk of at least 15 minutes?: 1 (1/11/2024 12:38 PM)  Are you able to run errands and shop?: 2 (1/11/2024 12:38 PM)  Does your health now limit you in doing two hours of physical labor?: 1 (1/11/2024 12:38 PM)  Does your health now limit you in doing moderate work around the house like vacuuming, sweeping floors, or carrying in groceries?: 1 (1/11/2024 12:38 PM)  PROMIS Physical Function Raw Summed Score: 8 (1/11/2024 12:38 PM)  PROMIS Physical Function T Score: 27.1 (1/11/2024 12:38 PM)        PFSH:  Past Medical History:   Diagnosis Date    Diabetes mellitus     GERD (gastroesophageal reflux disease)     Hyperlipidemia     Hypertension     Kidney stone     Low back pain     Numbness     left-sided intermittently        Past Surgical History:   Procedure Laterality Date    APPENDECTOMY      CARPAL TUNNEL RELEASE Bilateral     CYSTOSCOPY, RETROGRADE PYELOGRAM AND STENT INSERTION Left 6/24/2021    Procedure: CYSTOSCOPY RETROGRADE PYELOGRAM AND STENT INSERTION;  Surgeon: Jamil Santiago MD;  Location: [facility];  Service: Urology;  Laterality: Left;    ELBOW PROCEDURE Right     KNEE  "SURGERY Bilateral     LUMBAR LAMINECTOMY Right 1/16/2024    Procedure: LUMBAR 4-5 LAMINECTOMY MINIMALLY INVASIVE, right;  Surgeon: Jhonatan Contreras MD;  Location:  [facility];  Service: Neurosurgery;  Laterality: Right;    SINUS SURGERY      URETEROSCOPY LASER LITHOTRIPSY WITH STENT INSERTION Left 7/9/2021    Procedure: URETEROSCOPY LASER LITHOTRIPSY WITH STENT INSERTION;  Surgeon: Jamil Santiago MD;  Location:  [facility] OR;  Service: Urology;  Laterality: Left;       Objective      Current Outpatient Medications   Medication Sig Dispense Refill    aspirin 81 MG chewable tablet Chew 1 tablet Daily.      cyclobenzaprine (FLEXERIL) 10 MG tablet Take 1 tablet by mouth 3 (Three) Times a Day As Needed for Muscle Spasms. 30 tablet 0    desloratadine (CLARINEX) 5 MG tablet Take 1 tablet by mouth Daily.      Entresto 49-51 MG tablet Take 1 tablet by mouth 2 (Two) Times a Day.      esomeprazole (nexIUM) 40 MG capsule Take 1 capsule by mouth Every Morning Before Breakfast.      furosemide (LASIX) 40 MG tablet Take 1 tablet by mouth Daily.      glimepiride (AMARYL) 2 MG tablet Take 1 tablet by mouth Every Morning Before Breakfast.      Jardiance 25 MG tablet tablet Take 1 tablet by mouth Daily.      labetalol (NORMODYNE) 100 MG tablet Take 2 tablets by mouth Every 12 (Twelve) Hours.      naloxone (NARCAN) 4 MG/0.1ML nasal spray Call 911. Don't prime. [place] in 1 nostril for overdose. Repeat in 2-3 minutes in other nostril if no or minimal breathing/responsiveness. 2 each 0    sennosides-docusate (senna-docusate sodium) 8.6-50 MG per tablet Take 2 tablets by mouth Daily. 60 tablet 0    simvastatin (ZOCOR) 40 MG tablet Take 1 tablet by mouth Every Night.       No current facility-administered medications for this visit.       Vital Signs  Ht 198 cm (77.95\")   Wt 133 kg (294 lb)   BMI 34.02 kg/m²   Physical Exam  Vitals and nursing note reviewed.   Constitutional:       General: He is not in acute distress.     " Appearance: Normal appearance. He is well-developed and well-groomed. He is obese. He is not ill-appearing, toxic-appearing or diaphoretic.          Comments: BMI 34.0 to   HENT:      Head: Normocephalic and atraumatic.      Right Ear: Hearing normal.      Left Ear: Hearing normal.   Eyes:      Extraocular Movements: EOM normal.      Conjunctiva/sclera: Conjunctivae normal.      Pupils: Pupils are equal, round, and reactive to light.   Neck:      Trachea: Trachea normal.   Cardiovascular:      Rate and Rhythm: Normal rate and regular rhythm.   Pulmonary:      Effort: Pulmonary effort is normal. No tachypnea, bradypnea, accessory muscle usage or respiratory distress.   Abdominal:      Palpations: Abdomen is soft.   Musculoskeletal:      Cervical back: Full passive range of motion without pain and neck supple.   Skin:     General: Skin is warm and dry.   Neurological:      Mental Status: He is alert and oriented to person, place, and time.      GCS: GCS eye subscore is 4. GCS verbal subscore is 5. GCS motor subscore is 6.      Gait: Gait is intact.   Psychiatric:         Speech: Speech normal.         Behavior: Behavior normal. Behavior is cooperative.       Neurologic Exam     Mental Status   Oriented to person, place, and time.   Attention: normal. Concentration: normal.   Speech: speech is normal   Level of consciousness: alert    Cranial Nerves     CN II   Visual fields full to confrontation.     CN III, IV, VI   Pupils are equal, round, and reactive to light.  Extraocular motions are normal.     CN V   Facial sensation intact.     CN VII   Facial expression full, symmetric.     CN VIII   CN VIII normal.     CN IX, X   CN IX normal.     CN XI   CN XI normal.     Motor Exam   Right arm tone: normal  Left arm tone: normal  Right leg tone: normal  Left leg tone: normal    Strength   Right deltoid: 5/5  Left deltoid: 5/5  Right biceps: 5/5  Left biceps: 5/5  Right triceps: 5/5  Left triceps: 5/5  Right wrist  extension: 5/5  Left wrist extension: 5/5  Right iliopsoas: 5/5  Left iliopsoas: 5/5  Right quadriceps: 5/5  Left quadriceps: 5/5  Right anterior tibial: 5/5  Left anterior tibial: 5/5  Right gastroc: 5/5  Left gastroc: 5/5  Right EHL 5/5  Left EHL 5/5       Sensory Exam   Right arm light touch: normal  Left arm light touch: normal  Right leg light touch: normal  Left leg light touch: normal    Gait, Coordination, and Reflexes     Gait  Gait: normal    Tremor   Resting tremor: absent  Intention tremor: absent  Action tremor: absent    Incision: WOUND IMAGE - SP laminectomy (01/30/2024)   (Consent to obtain photo of postoperative site for documentation purposes only obtained verbally by Mr. Mccord)    Results Review: no new imaging      Assessment/Plan:   Lumbar stenosis with neurogenic claudication  Status post right L4-5 minimally invasive laminectomy  Hermilo Mccord is a 63 y.o. male who presents today for post operative wound check following a LUMBAR 4-5 LAMINECTOMY MINIMALLY INVASIVE, right - Right on 1/16/2024.  Mr. Mccord has done well since we last saw him.   His symptoms are stable.  His post operative incision is clean, dry, intact, and well approximated without signs of soft tissue infection.  We discussed the signs and symptoms of a soft tissue infection and I recommended they call immediately for any concerns.  Rx provided for physical [facility] to be [facility] at [facility].  I additionally recommended he continue Flexeril as prescribed for lower lumbar muscle stiffness. B/R/AE discussed. I advised the patient to keep scheduled appointment with Dr. Contreras for reassessment on 2/28/2024. Hermilo knows to call the neurosurgical clinic to return sooner for any new or additional concerns.      Class 1 obesity (BMI 30.0-34.9) due to excessive calories with serious comorbidities BMI of 34.0-34.9 in adult  Body mass index is 34.02 kg/m². Information on the DASH diet provided in the AVS.   We will continue to provided diet and exercise information with the goal of weight loss at each scheduled appointment.     Diagnoses and all orders for this visit:    1. S/P lumbar laminectomy (Primary)  -     Ambulatory Referral to Physical Therapy Evaluate and treat (2 TO 3 TIMES WEEKLY FOR 6 WEEK)    2. Spinal stenosis, lumbar region, with neurogenic claudication    3. Class 1 obesity due to excess calories with serious comorbidity and body mass index (BMI) of 34.0 to 34.9 in adult      Return for Keep scheduled appointment with Dr. Contreras.    I discussed the patients findings and my recommendations with patient    CAROLA Ramos

## 2024-01-30 NOTE — PATIENT INSTRUCTIONS
"DASH Eating Plan  DASH stands for Dietary Approaches to Stop Hypertension. The DASH eating plan is a healthy eating plan that has been shown to:  Reduce high blood pressure (hypertension).  Reduce your risk for type 2 diabetes, heart disease, and stroke.  Help with weight loss.  What are tips for following this plan?  Reading food labels  Check food labels for the amount of salt (sodium) per serving. Choose foods with less than 5 percent of the Daily Value of sodium. Generally, foods with less than 300 milligrams (mg) of sodium per serving fit into this eating plan.  To find whole grains, look for the word \"whole\" as the first word in the ingredient list.  Shopping  Buy products labeled as \"low-sodium\" or \"no salt added.\"  Buy fresh foods. Avoid canned foods and pre-made or frozen meals.  Cooking  Avoid adding salt when cooking. Use salt-free seasonings or herbs instead of table salt or sea salt. Check with your health care provider or pharmacist before using salt substitutes.  Do not wright foods. Cook foods using healthy methods such as baking, boiling, grilling, roasting, and broiling instead.  Cook with heart-healthy oils, such as olive, canola, avocado, soybean, or sunflower oil.  Meal planning    Eat a balanced diet that includes:  4 or more servings of fruits and 4 or more servings of vegetables each day. Try to fill one-half of your plate with fruits and vegetables.  6-8 servings of whole grains each day.  Less than 6 oz (170 g) of lean meat, poultry, or fish each day. A 3-oz (85-g) serving of meat is about the same size as a deck of cards. One egg equals 1 oz (28 g).  2-3 servings of low-fat dairy each day. One serving is 1 cup (237 mL).  1 serving of nuts, seeds, or beans 5 times each week.  2-3 servings of heart-healthy fats. Healthy fats called omega-3 fatty acids are found in foods such as walnuts, flaxseeds, fortified milks, and eggs. These fats are also found in cold-water fish, such as sardines, salmon, " and mackerel.  Limit how much you eat of:  Canned or prepackaged foods.  Food that is high in trans fat, such as some fried foods.  Food that is high in saturated fat, such as fatty meat.  Desserts and other sweets, sugary drinks, and other foods with added sugar.  Full-fat dairy products.  Do not salt foods before eating.  Do not eat more than 4 egg yolks a week.  Try to eat at least 2 vegetarian meals a week.  Eat more home-cooked food and less restaurant, buffet, and fast food.  Lifestyle  When eating at a restaurant, ask that your food be prepared with less salt or no salt, if possible.  If you drink alcohol:  Limit how much you use to:  0-1 drink a day for women who are not pregnant.  0-2 drinks a day for men.  Be aware of how much alcohol is in your drink. In the [place] one drink equals one 12 oz bottle of beer (355 mL), one 5 oz glass of wine (148 mL), or one 1½ oz glass of hard liquor (44 mL).  General information  Avoid eating more than 2,300 mg of salt a day. If you have hypertension, you may need to reduce your sodium intake to 1,500 mg a day.  Work with your health care provider to maintain a healthy body weight or to lose weight. Ask what an ideal weight is for you.  Get at least 30 minutes of exercise that causes your heart to beat faster (aerobic exercise) most days of the week. Activities may include walking, swimming, or biking.  Work with your health care provider or dietitian to adjust your eating plan to your individual calorie needs.  What foods should I eat?  Fruits  All fresh, dried, or frozen fruit. Canned fruit in natural juice (without added sugar).  Vegetables  Fresh or frozen vegetables (raw, steamed, roasted, or grilled). Low-sodium or reduced-sodium tomato and vegetable juice. Low-sodium or reduced-sodium tomato sauce and tomato paste. Low-sodium or reduced-sodium canned vegetables.  Grains  Whole-grain or whole-wheat bread. Whole-grain or whole-wheat pasta. Brown rice. Oatmeal. Quinoa.  Bulgur. Whole-grain and low-sodium cereals. Tia bread. Low-fat, low-sodium crackers. Whole-wheat flour tortillas.  Meats and other proteins  Skinless chicken or turkey. Ground chicken or turkey. Pork with fat trimmed off. Fish and seafood. Egg whites. Dried beans, peas, or lentils. Unsalted nuts, nut butters, and seeds. Unsalted canned beans. Lean cuts of beef with fat trimmed off. Low-sodium, lean precooked or cured meat, such as sausages or meat loaves.  Dairy  Low-fat (1%) or fat-free (skim) milk. Reduced-fat, low-fat, or fat-free cheeses. Nonfat, low-sodium ricotta or cottage cheese. Low-fat or nonfat yogurt. Low-fat, low-sodium cheese.  Fats and oils  Soft margarine without trans fats. Vegetable oil. Reduced-fat, low-fat, or light mayonnaise and salad dressings (reduced-sodium). Canola, safflower, olive, avocado, soybean, and sunflower oils. Avocado.  Seasonings and condiments  Herbs. Spices. Seasoning mixes without salt.  Other foods  Unsalted popcorn and pretzels. Fat-free sweets.  The items listed above may not be a complete list of foods and beverages you can eat. Contact a dietitian for more information.  What foods should I avoid?  Fruits  Canned fruit in a light or heavy syrup. Fried fruit. Fruit in cream or butter sauce.  Vegetables  Creamed or fried vegetables. Vegetables in a cheese sauce. Regular canned vegetables (not low-sodium or reduced-sodium). Regular canned tomato sauce and paste (not low-sodium or reduced-sodium). Regular tomato and vegetable juice (not low-sodium or reduced-sodium). Pickles. Olives.  Grains  Baked goods made with fat, such as croissants, muffins, or some breads. Dry pasta or rice meal packs.  Meats and other proteins  Fatty cuts of meat. Ribs. Fried meat. De Jesus. Bologna, salami, and other precooked or cured meats, such as sausages or meat loaves. Fat from the back of a pig (fatback). Bratwurst. Salted nuts and seeds. Canned beans with added salt. Canned or smoked fish.  Whole eggs or egg yolks. Chicken or turkey with skin.  Dairy  Whole or 2% milk, cream, and half-and-half. Whole or full-fat cream cheese. Whole-fat or sweetened yogurt. Full-fat cheese. Nondairy creamers. Whipped toppings. Processed cheese and cheese spreads.  Fats and oils  Butter. Stick margarine. Lard. Shortening. Ghee. De Jesus fat. Tropical oils, such as coconut, palm kernel, or palm oil.  Seasonings and condiments  Onion salt, garlic salt, seasoned salt, table salt, and sea salt. Worcestershire sauce. Tartar sauce. Barbecue sauce. Teriyaki sauce. Soy sauce, including reduced-sodium. Steak sauce. Canned and packaged gravies. Fish sauce. Oyster sauce. Cocktail sauce. Store-bought horseradish. Ketchup. Mustard. Meat flavorings and tenderizers. Bouillon cubes. Hot sauces. Pre-made or packaged marinades. Pre-made or packaged taco seasonings. Relishes. Regular salad dressings.  Other foods  Salted popcorn and pretzels.  The items listed above may not be a complete list of foods and beverages you should avoid. Contact a dietitian for more information.  Where to find more information  National Heart, Lung, and [employer] [place]: www.nhlbi.nih.gov  American Heart Association: www.heart.org  Academy of Nutrition and Dietetics: www.eatright.org  National Kidney Foundation: www.kidney.org  Summary  The DASH eating plan is a healthy eating plan that has been shown to reduce high blood pressure (hypertension). It may also reduce your risk for type 2 diabetes, heart disease, and stroke.  When on the DASH eating plan, aim to eat more fresh fruits and vegetables, whole grains, lean proteins, low-fat dairy, and heart-healthy fats.  With the DASH eating plan, you should limit salt (sodium) intake to 2,300 mg a day. If you have hypertension, you may need to reduce your sodium intake to 1,500 mg a day.  Work with your health care provider or dietitian to adjust your eating plan to your individual calorie needs.  This information is not  intended to replace advice given to you by your health care provider. Make sure you discuss any questions you have with your health care provider.  Document Revised: 11/20/2020 Document Reviewed: 11/20/2020  Elsevier Patient Education © 2023 [employer].

## 2024-02-28 ENCOUNTER — HOSPITAL ENCOUNTER (OUTPATIENT)
Dept: GENERAL RADIOLOGY | Facility: HOSPITAL | Age: 64
Discharge: HOME | End: 2024-02-28
Admitting: NEUROLOGICAL SURGERY
Payer: COMMERCIAL

## 2024-02-28 ENCOUNTER — OFFICE VISIT (OUTPATIENT)
Dept: NEUROSURGERY | Facility: CLINIC | Age: 64
End: 2024-02-28
Payer: COMMERCIAL

## 2024-02-28 VITALS — WEIGHT: 294 LBS | HEIGHT: 78 IN | BODY MASS INDEX: 34.02 KG/M2

## 2024-02-28 DIAGNOSIS — Z98.890 S/P LUMBAR LAMINECTOMY: ICD-10-CM

## 2024-02-28 DIAGNOSIS — M54.16 LUMBAR RADICULOPATHY: ICD-10-CM

## 2024-02-28 DIAGNOSIS — Z98.890 S/P LUMBAR LAMINECTOMY: Primary | ICD-10-CM

## 2024-02-28 DIAGNOSIS — E66.09 CLASS 1 OBESITY DUE TO EXCESS CALORIES WITH SERIOUS COMORBIDITY AND BODY MASS INDEX (BMI) OF 34.0 TO 34.9 IN ADULT: ICD-10-CM

## 2024-02-28 PROCEDURE — 99024 POSTOP FOLLOW-UP VISIT: CPT | Performed by: NEUROLOGICAL SURGERY

## 2024-02-28 PROCEDURE — 72114 X-RAY EXAM L-S SPINE BENDING: CPT

## 2024-02-28 RX ORDER — LEVETIRACETAM 500 MG/1
1 TABLET ORAL EVERY 12 HOURS SCHEDULED
COMMUNITY
Start: 2024-02-06

## 2024-02-28 NOTE — PROGRESS NOTES
Very minimal active movement at L4/5.  Only 1 or 2 mm.  At this point I think he is safe to proceed with physical therapy.  We will get the MRI in the interim.  Return to clinic after testing to physical therapy.

## 2024-02-28 NOTE — PROGRESS NOTES
Chief complaint:   Chief Complaint   Patient presents with    Back Pain     Patient here for 6wk post op visit. He had a lumbar laminectomy on 1/16/24.      Subjective     HPI:   Interval History: Hermilo Mccord is a 63 y.o.  male who presents today for post operative follow-up from a LUMBAR 4-5 LAMINECTOMY MINIMALLY INVASIVE, right - Right on 1/16/2024.  Mr. Mccord initially did very well postoperatively.  He had complete resolution of his right leg pain.  Approximately 2 weeks after the surgery his wife left him alone and he moved some furniture.  Since then he has now had left leg radicular pain in roughly an L4 distribution.  Radiating to his left lateral thigh and symptoms lateral calf.  Intermittent numbness in the same distribution.  Some pain with walking.  Also has some difficulty trying to hold his urine.  No incisional complications.    Of note recently started on Keppra by Dr. Gutierrez.  Has a longstanding history of left sided numbness when moving from a sitting to a standing position.  Has undergone an MRI of the brain which was normal and EEG which was normal    CHOCO = 40%    No data recorded    No data recorded  No data recorded      PFSH:  Past Medical History:   Diagnosis Date    Diabetes mellitus     GERD (gastroesophageal reflux disease)     Hyperlipidemia     Hypertension     Kidney stone     Low back pain     Numbness     left-sided intermittently        Past Surgical History:   Procedure Laterality Date    APPENDECTOMY      CARPAL TUNNEL RELEASE Bilateral     CYSTOSCOPY, RETROGRADE PYELOGRAM AND STENT INSERTION Left 6/24/2021    Procedure: CYSTOSCOPY RETROGRADE PYELOGRAM AND STENT INSERTION;  Surgeon: Jamil Santiago MD;  Location: [facility];  Service: Urology;  Laterality: Left;    ELBOW PROCEDURE Right     KNEE SURGERY Bilateral     LUMBAR LAMINECTOMY Right 1/16/2024    Procedure: LUMBAR 4-5 LAMINECTOMY MINIMALLY INVASIVE, right;  Surgeon: Jhonatan Contreras MD;   "Location:  [facility] OR;  Service: Neurosurgery;  Laterality: Right;    SINUS SURGERY      URETEROSCOPY LASER LITHOTRIPSY WITH STENT INSERTION Left 7/9/2021    Procedure: URETEROSCOPY LASER LITHOTRIPSY WITH STENT INSERTION;  Surgeon: Jamil Santiago MD;  Location: [facility] OR;  Service: Urology;  Laterality: Left;       Objective      Current Outpatient Medications   Medication Sig Dispense Refill    levETIRAcetam (KEPPRA) 500 MG tablet Take 1 tablet by mouth Every 12 (Twelve) Hours.      aspirin 81 MG chewable tablet Chew 1 tablet Daily.      cyclobenzaprine (FLEXERIL) 10 MG tablet Take 1 tablet by mouth 3 (Three) Times a Day As Needed for Muscle Spasms. 30 tablet 0    desloratadine (CLARINEX) 5 MG tablet Take 1 tablet by mouth Daily.      Entresto 49-51 MG tablet Take 1 tablet by mouth 2 (Two) Times a Day.      esomeprazole (nexIUM) 40 MG capsule Take 1 capsule by mouth Every Morning Before Breakfast.      furosemide (LASIX) 40 MG tablet Take 1 tablet by mouth Daily.      glimepiride (AMARYL) 2 MG tablet Take 1 tablet by mouth Every Morning Before Breakfast.      Jardiance 25 MG tablet tablet Take 1 tablet by mouth Daily.      labetalol (NORMODYNE) 100 MG tablet Take 2 tablets by mouth Every 12 (Twelve) Hours.      naloxone (NARCAN) 4 MG/0.1ML nasal spray Call 911. Don't prime. Florien in 1 nostril for overdose. Repeat in 2-3 minutes in other nostril if no or minimal breathing/responsiveness. 2 each 0    sennosides-docusate (senna-docusate sodium) 8.6-50 MG per tablet Take 2 tablets by mouth Daily. 60 tablet 0    simvastatin (ZOCOR) 40 MG tablet Take 1 tablet by mouth Every Night.       No current facility-administered medications for this visit.       Vital Signs  Ht 198.1 cm (78\")   Wt 133 kg (294 lb)   BMI 33.98 kg/m²   Physical Exam  Vitals and nursing note reviewed.   Constitutional:       General: He is not in acute distress.     Appearance: Normal appearance. He is well-developed and well-groomed. He is " obese. He is not ill-appearing, toxic-appearing or diaphoretic.          Comments: BMI 34.0 to   HENT:      Head: Normocephalic and atraumatic.      Right Ear: Hearing normal.      Left Ear: Hearing normal.   Eyes:      Extraocular Movements: EOM normal.      Conjunctiva/sclera: Conjunctivae normal.      Pupils: Pupils are equal, round, and reactive to light.   Neck:      Trachea: Trachea normal.   Cardiovascular:      Rate and Rhythm: Normal rate and regular rhythm.   Pulmonary:      Effort: Pulmonary effort is normal. No tachypnea, bradypnea, accessory muscle usage or respiratory distress.   Abdominal:      Palpations: Abdomen is soft.   Musculoskeletal:      Cervical back: Full passive range of motion without pain and neck supple.   Skin:     General: Skin is warm and dry.   Neurological:      Mental Status: He is alert and oriented to person, place, and time.      GCS: GCS eye subscore is 4. GCS verbal subscore is 5. GCS motor subscore is 6.      Gait: Gait is intact.   Psychiatric:         Speech: Speech normal.         Behavior: Behavior normal. Behavior is cooperative.       Neurologic Exam     Mental Status   Oriented to person, place, and time.   Attention: normal. Concentration: normal.   Speech: speech is normal   Level of consciousness: alert    Cranial Nerves     CN II   Visual fields full to confrontation.     CN III, IV, VI   Pupils are equal, round, and reactive to light.  Extraocular motions are normal.     CN V   Facial sensation intact.     CN VII   Facial expression full, symmetric.     CN VIII   CN VIII normal.     CN IX, X   CN IX normal.     CN XI   CN XI normal.     Motor Exam   Right arm tone: normal  Left arm tone: normal  Right leg tone: normal  Left leg tone: normal    Strength   Right deltoid: 5/5  Left deltoid: 5/5  Right biceps: 5/5  Left biceps: 5/5  Right triceps: 5/5  Left triceps: 5/5  Right wrist extension: 5/5  Left wrist extension: 5/5  Right iliopsoas: 5/5  Left iliopsoas:  5/5  Right quadriceps: 5/5  Left quadriceps: 5/5  Right anterior tibial: 5/5  Left anterior tibial: 5/5  Right gastroc: 5/5  Left gastroc: 5/5  Right EHL 5/5  Left EHL 5/5       Sensory Exam   Right arm light touch: normal  Left arm light touch: normal  Right leg light touch: normal  Left leg light touch: decreased from knee  Sensory deficit distribution on left: L5  Right leg L4/5 radicular symptoms resolved     Gait, Coordination, and Reflexes     Gait  Gait: normal    Tremor   Resting tremor: absent  Intention tremor: absent  Action tremor: absentAntalgic and unassisted     Incision:   CDI    Results Review: no new imaging    1/2024 MRI of the cervical spine reviewed.  Minor degenerative disc changes but no cord compression.      8/8/2023      10/31/2023      MRI lumbar spine 1/3/24 -severe lumbar stenosis at L4/5      Assessment/Plan:   Hermilo Mccord is a 63 y.o. male with a significant medical history of hypertension, hyperlipidemia, GERD, diabetes, and obesity.  He presents with a known problem of lumbar back and bilateral leg pain in the l4 distribution, right > left, 50% legs, 50% back. CHOCO: 36,50>40.  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  His imaging shows no evidence of acute fractures, anteriolisthesis of L4 over L5 that appears relatively unchanged in the upright versus supine position, and moderate multilevel degenerative disc disease.  MRI of the lumbar spine with severe stenosis at L4/5     Recommendations:  Mechanical lumbar back pain  Right leg pain in the L4 distribution, resolved  Neurogenic Claudication, Lumbar Stenosis  Status post right hemilaminectomy  New, left leg radicular pain  Joseph, his wife and I discussed possible etiologies for his left leg pain.  Some of this could be scar tissue related, instability, or failure to fully decompress his left lateral recess.   Therefore I would like to obtain flexion-extension films of the lumbar spine today.  These are normal and show no evidence of instability then would move forward with 6 weeks of physical therapy and Occupational Therapy.  Will also work on getting a noncontrast MRI of the lumbar spine to evaluate for restenosis or disc herniation.  Will see him back in 6 months for follow-up but will call with results of the imaging if abnormal.    Hyperreflexia with bilateral Babinski's  Normal MRI of the cervical spine  Conservative management    Diagnoses and all orders for this visit:    1. S/P lumbar laminectomy (Primary)  -     Ambulatory Referral to Physical Therapy Evaluate and treat  -     XR Spine Lumbar Complete With Flex & Ext; Future  -     MRI Lumbar Spine Without Contrast; Future    2. Lumbar radiculopathy  -     Ambulatory Referral to Physical Therapy Evaluate and treat  -     XR Spine Lumbar Complete With Flex & Ext; Future  -     MRI Lumbar Spine Without Contrast; Future    3. Class 1 obesity due to excess calories with serious comorbidity and body mass index (BMI) of 34.0 to 34.9 in adult        Return in about 6 months (around 8/28/2024) for 6mo with Gildardo.    I discussed the patients findings and my recommendations with patient    Jhonatan Contreras MD

## 2024-03-20 ENCOUNTER — HOSPITAL ENCOUNTER (OUTPATIENT)
Dept: MRI IMAGING | Facility: HOSPITAL | Age: 64
Discharge: HOME | End: 2024-03-20
Admitting: NEUROLOGICAL SURGERY
Payer: COMMERCIAL

## 2024-03-20 DIAGNOSIS — M54.16 LUMBAR RADICULOPATHY: ICD-10-CM

## 2024-03-20 DIAGNOSIS — Z98.890 S/P LUMBAR LAMINECTOMY: ICD-10-CM

## 2024-03-20 PROCEDURE — 72148 MRI LUMBAR SPINE W/O DYE: CPT

## 2024-03-20 NOTE — PROGRESS NOTES
Postoperative MRI of the lumbar spine looks great!  Shows good decompression at L4/5 without complication.  Nerve roots free.

## 2024-08-15 ENCOUNTER — OFFICE VISIT (OUTPATIENT)
Dept: NEUROSURGERY | Facility: CLINIC | Age: 64
End: 2024-08-15
Payer: COMMERCIAL

## 2024-08-15 VITALS — HEIGHT: 78 IN | BODY MASS INDEX: 34.02 KG/M2 | WEIGHT: 294 LBS

## 2024-08-15 DIAGNOSIS — Z98.890 S/P LUMBAR LAMINECTOMY: ICD-10-CM

## 2024-08-15 DIAGNOSIS — M48.062 SPINAL STENOSIS, LUMBAR REGION, WITH NEUROGENIC CLAUDICATION: Primary | ICD-10-CM

## 2024-08-15 DIAGNOSIS — E66.09 CLASS 1 OBESITY DUE TO EXCESS CALORIES WITH SERIOUS COMORBIDITY AND BODY MASS INDEX (BMI) OF 33.0 TO 33.9 IN ADULT: ICD-10-CM

## 2024-08-15 DIAGNOSIS — M54.16 LUMBAR RADICULOPATHY: ICD-10-CM

## 2024-08-15 PROCEDURE — 99213 OFFICE O/P EST LOW 20 MIN: CPT | Performed by: NURSE PRACTITIONER

## 2024-08-15 NOTE — PROGRESS NOTES
Chief complaint:   Chief Complaint   Patient presents with    Back Pain     Pt is here for 6 mo. Followup.     Subjective     HPI:   Hermilo Mccord is a 63 y.o.  male who presents today for continued evaluation following a minimally invasive right L4-5 laminectomy that was performed on 1/16/2024.  Mr. Mccord has done extremely well since we last saw him.  He currently denies lower back pain or lower extremity radicular pain, weakness, numbness, or tingling.  He has more or less returned to normal daily activities to include golfing, stating he played 36 holes of golf 2 day ago.  He denies saddle anesthesia or bowel or bladder dysfunction.  He currently rates the severity of his symptoms 0/10.    ROS  Review of Systems   Constitutional: Negative.    HENT: Negative.     Eyes: Negative.    Respiratory: Negative.     Cardiovascular: Negative.    Gastrointestinal: Negative.    Endocrine: Negative.    Genitourinary: Negative.    Musculoskeletal: Negative.    Skin: Negative.    Allergic/Immunologic: Negative.    Neurological: Negative.    Hematological: Negative.    Psychiatric/Behavioral: Negative.     All other systems reviewed and are negative.    PFSH:  Past Medical History:   Diagnosis Date    Diabetes mellitus     GERD (gastroesophageal reflux disease)     Hyperlipidemia     Hypertension     Kidney stone     Low back pain     Numbness     left-sided intermittently      Past Surgical History:   Procedure Laterality Date    APPENDECTOMY      CARPAL TUNNEL RELEASE Bilateral     CYSTOSCOPY, RETROGRADE PYELOGRAM AND STENT INSERTION Left 6/24/2021    Procedure: CYSTOSCOPY RETROGRADE PYELOGRAM AND STENT INSERTION;  Surgeon: Jamil Santiago MD;  Location: [facility];  Service: Urology;  Laterality: Left;    ELBOW PROCEDURE Right     KNEE SURGERY Bilateral     LUMBAR LAMINECTOMY Right 1/16/2024    Procedure: LUMBAR 4-5 LAMINECTOMY MINIMALLY INVASIVE, right;  Surgeon: Jhonatan Contreras MD;  Location:  " [facility];  Service: Neurosurgery;  Laterality: Right;    SINUS SURGERY      URETEROSCOPY LASER LITHOTRIPSY WITH STENT INSERTION Left 7/9/2021    Procedure: URETEROSCOPY LASER LITHOTRIPSY WITH STENT INSERTION;  Surgeon: Jamil Santiago MD;  Location: [facility] OR;  Service: Urology;  Laterality: Left;     Objective      Current Outpatient Medications   Medication Sig Dispense Refill    aspirin 81 MG chewable tablet Chew 1 tablet Daily.      cyclobenzaprine (FLEXERIL) 10 MG tablet Take 1 tablet by mouth 3 (Three) Times a Day As Needed for Muscle Spasms. 30 tablet 0    desloratadine (CLARINEX) 5 MG tablet Take 1 tablet by mouth Daily.      Entresto 49-51 MG tablet Take 1 tablet by mouth 2 (Two) Times a Day.      esomeprazole (nexIUM) 40 MG capsule Take 1 capsule by mouth Every Morning Before Breakfast.      furosemide (LASIX) 40 MG tablet Take 1 tablet by mouth Daily.      glimepiride (AMARYL) 2 MG tablet Take 1 tablet by mouth Every Morning Before Breakfast.      Jardiance 25 MG tablet tablet Take 1 tablet by mouth Daily.      labetalol (NORMODYNE) 100 MG tablet Take 2 tablets by mouth Every 12 (Twelve) Hours.      levETIRAcetam (KEPPRA) 500 MG tablet Take 1 tablet by mouth Every 12 (Twelve) Hours.      naloxone (NARCAN) 4 MG/0.1ML nasal spray Call 911. Don't prime. [place] in 1 nostril for overdose. Repeat in 2-3 minutes in other nostril if no or minimal breathing/responsiveness. 2 each 0    sennosides-docusate (senna-docusate sodium) 8.6-50 MG per tablet Take 2 tablets by mouth Daily. 60 tablet 0    simvastatin (ZOCOR) 40 MG tablet Take 1 tablet by mouth Every Night.       No current facility-administered medications for this visit.     Vital Signs  Ht 198.1 cm (78\")   Wt 133 kg (294 lb)   BMI 33.98 kg/m²   Physical Exam  Vitals and nursing note reviewed.   Constitutional:       General: He is not in acute distress.     Appearance: Normal appearance. He is well-developed and well-groomed. He is obese. He is not " ill-appearing, toxic-appearing or diaphoretic.      Comments: BMI 33.98   HENT:      Head: Normocephalic and atraumatic.      Right Ear: Hearing normal.      Left Ear: Hearing normal.   Eyes:      General: Lids are normal.      Extraocular Movements: Extraocular movements intact.      Conjunctiva/sclera: Conjunctivae normal.      Pupils: Pupils are equal, round, and reactive to light.   Neck:      Trachea: Trachea normal.   Cardiovascular:      Rate and Rhythm: Normal rate and regular rhythm.   Pulmonary:      Effort: Pulmonary effort is normal. No tachypnea, bradypnea, accessory muscle usage or respiratory distress.   Abdominal:      Palpations: Abdomen is soft.   Musculoskeletal:      Cervical back: Full passive range of motion without pain and neck supple.   Skin:     General: Skin is warm and dry.   Neurological:      Mental Status: He is alert.      GCS: GCS eye subscore is 4. GCS verbal subscore is 5. GCS motor subscore is 6.      Coordination: Coordination is intact.      Deep Tendon Reflexes:      Reflex Scores:       Tricep reflexes are 2+ on the right side and 2+ on the left side.       Bicep reflexes are 2+ on the right side and 2+ on the left side.       Brachioradialis reflexes are 2+ on the right side and 2+ on the left side.       Patellar reflexes are 3+ on the right side and 3+ on the left side.       Achilles reflexes are 0 on the right side and 0 on the left side.  Psychiatric:         Speech: Speech normal.         Behavior: Behavior normal. Behavior is cooperative.       Neurological Exam  Mental Status  Alert. Speech is normal. Language is fluent with no aphasia. Attention and concentration are normal.    Cranial Nerves  CN I: Sense of smell is normal.  CN II: Visual acuity is normal.  CN III, IV, VI: Extraocular movements intact bilaterally. Normal lids and orbits bilaterally. Pupils equal round and reactive to light bilaterally.  CN V: Facial sensation is normal.  CN VII: Full and symmetric  facial movement.  CN IX, X: Palate elevates symmetrically  CN XI: Shoulder shrug strength is normal.  CN XII: Tongue midline without atrophy or fasciculations.    Motor  Normal muscle bulk throughout. Normal muscle tone.                                               Right                     Left  Toe extension                        5                          5                                             Right                     Left  Deltoid                                   5                          5   Biceps                                   5                          5   Triceps                                  5                          5   Wrist extensor                       5                          5   Iliopsoas                               5                          5   Quadriceps                           5                          5   Anterior tibialis                      5                          5   Posterior tibialis                    5                          5    Sensory  Sensation is intact to light touch, pinprick, vibration and proprioception in all four extremities.    Reflexes                                            Right                      Left  Brachioradialis                    2+                         2+  Biceps                                 2+                         2+  Triceps                                2+                         2+  Patellar                                3+                         3+  Achilles                                0                         0  Right Plantar: upgoing  Left Plantar: upgoing    Coordination    Finger-to-nose, rapid alternating movements and heel-to-shin normal bilaterally without dysmetria.    Gait  Casual gait is normal including stance, stride, and arm swing.    (12 bullet pts)    Results Review:   1/2024 MRI of the cervical spine reviewed.  Minor degenerative disc changes but no cord compression.       8/8/2023        10/31/2023       MRI lumbar spine 1/3/24 -severe lumbar stenosis at L4/5      2/28/2024.  Unchanged anteriolisthesis of L4 over L5 with very mild movement with flexion in comparison to the upright and extension view      3/20/2024.  MRI of the lumbar spine shows no acute fractures, good decompression at L4-5 in comparison to prior imaging from 1/3/2024.      Assessment/Plan:   Hermilo Mccord is a 63 y.o. male with significant medical comorbidities to include hypertension, hyperlipidemia, GERD, diabetes, and obesity.  He presents today asymptomatic for continued evaluation following a minimally invasive right L4-5 laminectomy that was performed on 1/16/2024.  Physical exam findings of absent bilateral Achilles and a well-maintained gait.  His prior imaging from 3/20/2024 in comparison to imaging from 1/3/2024 shows no evidence of central canal stenosis with excellent post operative decompression at L4-5.     Recommendations:  Mechanical lumbar back pain, resolved  Right leg pain in the L4 distribution, resolved  Neurogenic Claudication, Lumbar Stenosis, resolved  Status post right hemilaminectomy (1/16/2024)  Joseph presents today asymptomatic for continued evaluation following a minimally invasive right L4-5 laminectomy performed on 1/16/2024.  He has returned to normal daily activities, to include, not limited to multiple games of golf daily.  Based on these findings no additional imaging or follow-up warranted at this time.  Mr. Mccord knows he may contact the neurosurgical clinic to return sooner for any new or additional concerns and agrees with this plan of care.    Class 1 obesity (BMI 30.0-34.9) due to excessive calories with serious comorbidities BMI of 33.0-33.9 in adult  Body mass index is 33.98 kg/m². Information on the DASH diet provided in the AVS.  We will continue to provided diet and exercise information with the goal of weight loss at each scheduled appointment.     Diagnoses and all orders  for this visit:    1. Spinal stenosis, lumbar region, with neurogenic claudication (Primary)  Comments:  Resolved    2. Lumbar radiculopathy  Comments:  Resolved    3. S/P lumbar laminectomy    4. Class 1 obesity due to excess calories with serious comorbidity and body mass index (BMI) of 33.0 to 33.9 in adult      Return if symptoms worsen or fail to improve.    Thank you, for allowing me to continue to participate in the care of this patient.    Sincerely,  CAROLA Ramos

## 2024-08-15 NOTE — PATIENT INSTRUCTIONS
"DASH Eating Plan  DASH stands for Dietary Approaches to Stop Hypertension. The DASH eating plan is a healthy eating plan that has been shown to:  Lower high blood pressure (hypertension).  Reduce your risk for type 2 diabetes, heart disease, and stroke.  Help with weight loss.  What are tips for following this plan?  Reading food labels  Check food labels for the amount of salt (sodium) per serving. Choose foods with less than 5 percent of the Daily Value (DV) of sodium. In general, foods with less than 300 milligrams (mg) of sodium per serving fit into this eating plan.  To find whole grains, look for the word \"whole\" as the first word in the ingredient list.  Shopping  Buy products labeled as \"low-sodium\" or \"no salt added.\"  Buy fresh foods. Avoid canned foods and pre-made or frozen meals.  Cooking  Try not to add salt when you cook. Use salt-free seasonings or herbs instead of table salt or sea salt. Check with your health care provider or pharmacist before using salt substitutes.  Do not wright foods. Cook foods in healthy ways, such as baking, boiling, grilling, roasting, or broiling.  Cook using oils that are good for your heart. These include olive, canola, avocado, soybean, and sunflower oil.  Meal planning    Eat a balanced diet. This should include:  4 or more servings of fruits and 4 or more servings of vegetables each day. Try to fill half of your plate with fruits and vegetables.  6-8 servings of whole grains each day.  6 or less servings of lean meat, poultry, or fish each day. 1 oz is 1 serving. A 3 oz (85 g) serving of meat is about the same size as the palm of your hand. One egg is 1 oz (28 g).  2-3 servings of low-fat dairy each day. One serving is 1 cup (237 mL).  1 serving of nuts, seeds, or beans 5 times each week.  2-3 servings of heart-healthy fats. Healthy fats called omega-3 fatty acids are found in foods such as walnuts, flaxseeds, fortified milks, and eggs. These fats are also found in " cold-water fish, such as sardines, salmon, and mackerel.  Limit how much you eat of:  Canned or prepackaged foods.  Food that is high in trans fat, such as fried foods.  Food that is high in saturated fat, such as fatty meat.  Desserts and other sweets, sugary drinks, and other foods with added sugar.  Full-fat dairy products.  Do not salt foods before eating.  Do not eat more than 4 egg yolks a week.  Try to eat at least 2 vegetarian meals a week.  Eat more home-cooked food and less restaurant, buffet, and fast food.  Lifestyle  When eating at a restaurant, ask if your food can be made with less salt or no salt.  If you drink alcohol:  Limit how much you have to:  0-1 drink a day if you are female.  0-2 drinks a day if you are male.  Know how much alcohol is in your drink. In the [place] one drink is one 12 oz bottle of beer (355 mL), one 5 oz glass of wine (148 mL), or one 1½ oz glass of hard liquor (44 mL).  General information  Avoid eating more than 2,300 mg of salt a day. If you have hypertension, you may need to reduce your sodium intake to 1,500 mg a day.  Work with your provider to stay at a healthy body weight or lose weight. Ask what the best weight range is for you.  On most days of the week, get at least 30 minutes of exercise that causes your heart to beat faster. This may include walking, swimming, or biking.  Work with your provider or dietitian to adjust your eating plan to meet your specific calorie needs.  What foods should I eat?  Fruits  All fresh, dried, or frozen fruit. Canned fruits that are in their natural juice and do not have sugar added to them.  Vegetables  Fresh or frozen vegetables that are raw, steamed, roasted, or grilled. Low-sodium or reduced-sodium tomato and vegetable juice. Low-sodium or reduced-sodium tomato sauce and tomato paste. Low-sodium or reduced-sodium canned vegetables.  Grains  Whole-grain or whole-wheat bread. Whole-grain or whole-wheat pasta. Brown rice. Oatmeal.  Quinoa. Bulgur. Whole-grain and low-sodium cereals. Tia bread. Low-fat, low-sodium crackers. Whole-wheat flour tortillas.  Meats and other proteins  Skinless chicken or turkey. Ground chicken or turkey. Pork with fat trimmed off. Fish and seafood. Egg whites. Dried beans, peas, or lentils. Unsalted nuts, nut butters, and seeds. Unsalted canned beans. Lean cuts of beef with fat trimmed off. Low-sodium, lean precooked or cured meat, such as sausages or meat loaves.  Dairy  Low-fat (1%) or fat-free (skim) milk. Reduced-fat, low-fat, or fat-free cheeses. Nonfat, low-sodium ricotta or cottage cheese. Low-fat or nonfat yogurt. Low-fat, low-sodium cheese.  Fats and oils  Soft margarine without trans fats. Vegetable oil. Reduced-fat, low-fat, or light mayonnaise and salad dressings (reduced-sodium). Canola, safflower, olive, avocado, soybean, and sunflower oils. Avocado.  Seasonings and condiments  Herbs. Spices. Seasoning mixes without salt.  Other foods  Unsalted popcorn and pretzels. Fat-free sweets.  The items listed above may not be all the foods and drinks you can have. Talk to a dietitian to learn more.  What foods should I avoid?  Fruits  Canned fruit in a light or heavy syrup. Fried fruit. Fruit in cream or butter sauce.  Vegetables  Creamed or fried vegetables. Vegetables in a cheese sauce. Regular canned vegetables that are not marked as low-sodium or reduced-sodium. Regular canned tomato sauce and paste that are not marked as low-sodium or reduced-sodium. Regular tomato and vegetable juices that are not marked as low-sodium or reduced-sodium. Pickles. Olives.  Grains  Baked goods made with fat, such as croissants, muffins, or some breads. Dry pasta or rice meal packs.  Meats and other proteins  Fatty cuts of meat. Ribs. Fried meat. De Jesus. Bologna, salami, and other precooked or cured meats, such as sausages or meat loaves, that are not lean and low in sodium. Fat from the back of a pig (fatback). Cecilio.  Salted nuts and seeds. Canned beans with added salt. Canned or smoked fish. Whole eggs or egg yolks. Chicken or turkey with skin.  Dairy  Whole or 2% milk, cream, and half-and-half. Whole or full-fat cream cheese. Whole-fat or sweetened yogurt. Full-fat cheese. Nondairy creamers. Whipped toppings. Processed cheese and cheese spreads.  Fats and oils  Butter. Stick margarine. Lard. Shortening. Ghee. De Jesus fat. Tropical oils, such as coconut, palm kernel, or palm oil.  Seasonings and condiments  Onion salt, garlic salt, seasoned salt, table salt, and sea salt. Worcestershire sauce. Tartar sauce. Barbecue sauce. Teriyaki sauce. Soy sauce, including reduced-sodium soy sauce. Steak sauce. Canned and packaged gravies. Fish sauce. Oyster sauce. Cocktail sauce. Store-bought horseradish. Ketchup. Mustard. Meat flavorings and tenderizers. Bouillon cubes. Hot sauces. Pre-made or packaged marinades. Pre-made or packaged taco seasonings. Relishes. Regular salad dressings.  Other foods  Salted popcorn and pretzels.  The items listed above may not be all the foods and drinks you should avoid. Talk to a dietitian to learn more.  Where to find more information  National Heart, Lung, and Blood [place] (NHLBI): nhlbi.nih.gov  American Heart Association (AHA): heart.org  Academy of Nutrition and Dietetics: eatright.org  National Kidney Foundation (NKF): kidney.org  This information is not intended to replace advice given to you by your health care provider. Make sure you discuss any questions you have with your health care provider.  Document Revised: 01/04/2024 Document Reviewed: 01/04/2024  Elsevier Patient Education © 2024 [employer]

## 2025-04-09 NOTE — PROGRESS NOTES
Subjective:       Patient ID: Scott Sullivan is a 60 y.o. male.    Vitals:  vitals were not taken for this visit.     Chief Complaint: COVID-19 Concerns    CDC Testing and Quarantine Guidelines for Exposure:         A close exposure is defined as anyone who has had an exposure (masked or unmasked) to a known COVID -19 positive person within 6 ft for longer than 15 minutes. If your exposure meets this definition you are required by CDC guidelines to quarantine for at least 7-10 days from time of exposure. The CDC states that a test can be performed for an asymptomatic patient (someone who does not have any symptoms) after a close exposure, and that a test should be done if you develop symptoms after a close exposure as described above.  Specifically, you can test at day 5 or later if asymptomatic, in order to get released from quarantine on day 7 or later.  If you develop symptoms sooner, you should test when your symptoms start.  If you meet the definition of a close exposure, it will not matter whether you are experiencing symptoms- a quarantine for at least 7-10 days after a close exposure is required by CDC guidelines.  Please note, if you decide to test as an asymptomatic during your quarantine and you are positive, you will be restarting your quarantine and moving from a possible 10 day quarantine (if you do not test), to a 11 day or greater quarantine.  The CDC also suggests people still monitor for symptoms for a full 14 days and remember that the shorter quarantine options do not replace initial CDC guidance.  The CDC continues to recommend quarantining for 14 days as the best way to reduce risk for spreading COVID-19 - however, this is only a recommendation.  If your exposure does not meet the above definition, you can return to your normal daily activities to include social distancing, wearing a mask and frequent handwashing    ROS    Objective:      Physical Exam      Assessment:       1. Screening  for viral disease          Plan:         Screening for viral disease  -     POCT COVID-19 Rapid Screening                      Imaging Studies

## (undated) DEVICE — APPL CHLORAPREP HI/LITE 26ML ORNG

## (undated) DEVICE — CATH FOL COUNCL 2WY 18F 5CC

## (undated) DEVICE — 3.0MM PRECISION NEURO (MATCH HEAD)

## (undated) DEVICE — FIBR LASR MOSES 200 DFL 2J 80HZ

## (undated) DEVICE — GLW STD STR 3CM .035X150CM

## (undated) DEVICE — NEEDLE, QUINCKE 25GX3.5": Brand: MEDLINE

## (undated) DEVICE — 3M™ STERI-STRIP™ REINFORCED ADHESIVE SKIN CLOSURES, R1547, 1/2 IN X 4 IN (12 MM X 100 MM), 6 STRIPS/ENVELOPE: Brand: 3M™ STERI-STRIP™

## (undated) DEVICE — ADHS SKIN PREMIERPRO EXOFIN TOPICAL HI/VISC .5ML

## (undated) DEVICE — SUT VIC 0 MO4 CR8 18IN VCP701D

## (undated) DEVICE — CLTH CLENS READYCLEANSE PERI CARE PK/5

## (undated) DEVICE — PK CYSTO 30

## (undated) DEVICE — GLV SURG SENSICARE W/ALOE PF LF 7.5 STRL

## (undated) DEVICE — SUT VIC 0 UR6 27IN VCP603H

## (undated) DEVICE — BAG,DRAINAGE,4L,A/R TOWER,LL,SLIDE TAP: Brand: MEDLINE

## (undated) DEVICE — CATH IV ANGIO FEP 12G 3IN LTBLU 10PK

## (undated) DEVICE — PK TURNOVER CYSTO RM

## (undated) DEVICE — CATH URETRL OPN/END 5F70CM

## (undated) DEVICE — DRP C/ARMOR

## (undated) DEVICE — GLW STFF STR 3CM .035X150CM

## (undated) DEVICE — GLV SURG BIOGEL M LTX PF 7 1/2

## (undated) DEVICE — CONN FLX BREATHE CIRCT

## (undated) DEVICE — PK SPINE POST 30

## (undated) DEVICE — ELECTRD BLD EZ CLN MOD 6.5IN

## (undated) DEVICE — GLV SURG BIOGEL LTX PF 6 1/2

## (undated) DEVICE — PENCL ES MEGADINE EZ/CLEAN BUTN W/HOLSTR 10FT

## (undated) DEVICE — ANTIBACTERIAL UNDYED BRAIDED (POLYGLACTIN 910), SYNTHETIC ABSORBABLE SUTURE: Brand: COATED VICRYL

## (undated) DEVICE — SUT MNCRYL 4/0 PS2 27IN UD MCP426H

## (undated) DEVICE — SPK10277 JACKSON/PRO-AXIS KIT: Brand: SPK10277 JACKSON/PRO-AXIS KIT

## (undated) DEVICE — ELECTRD BLD EZ CLN MOD XLNG 2.75IN

## (undated) DEVICE — DUAL LUMEN URETERAL CATHETER

## (undated) DEVICE — GLV SURG DERMASSURE GRN LF PF 8.0

## (undated) DEVICE — SYS IRR PUMP SGL ACTN VAC SYR 10CC

## (undated) DEVICE — ENDOSCOPIC SEAL URO 1 SIZE FITS ALL: Brand: ENDOSCOPIC SEAL

## (undated) DEVICE — GLV SURG DERMASSURE GRN LF PF 7.0

## (undated) DEVICE — TRAP FLD MINIVAC MEGADYNE 100ML

## (undated) DEVICE — SHEATH URETRL ACC NAVIGATOR 11/13F 46CM 5PK